# Patient Record
Sex: MALE | Race: WHITE | NOT HISPANIC OR LATINO | URBAN - NONMETROPOLITAN AREA
[De-identification: names, ages, dates, MRNs, and addresses within clinical notes are randomized per-mention and may not be internally consistent; named-entity substitution may affect disease eponyms.]

---

## 2017-02-14 ENCOUNTER — APPOINTMENT (OUTPATIENT)
Dept: GENERAL RADIOLOGY | Facility: HOSPITAL | Age: 57
End: 2017-02-14

## 2017-02-14 ENCOUNTER — APPOINTMENT (OUTPATIENT)
Dept: CT IMAGING | Facility: HOSPITAL | Age: 57
End: 2017-02-14

## 2017-02-14 ENCOUNTER — HOSPITAL ENCOUNTER (EMERGENCY)
Facility: HOSPITAL | Age: 57
Discharge: HOME | End: 2017-02-14
Attending: EMERGENCY MEDICINE | Admitting: EMERGENCY MEDICINE

## 2017-02-14 VITALS
OXYGEN SATURATION: 97 % | RESPIRATION RATE: 18 BRPM | SYSTOLIC BLOOD PRESSURE: 137 MMHG | WEIGHT: 150 LBS | DIASTOLIC BLOOD PRESSURE: 87 MMHG | TEMPERATURE: 97.8 F | HEART RATE: 84 BPM | HEIGHT: 66 IN | BODY MASS INDEX: 24.11 KG/M2

## 2017-02-14 DIAGNOSIS — J42 CHRONIC BRONCHITIS WITH ACUTE EXACERBATION (HCC): Primary | ICD-10-CM

## 2017-02-14 DIAGNOSIS — R07.2 CHEST PAIN, PRECORDIAL: ICD-10-CM

## 2017-02-14 DIAGNOSIS — J20.9 CHRONIC BRONCHITIS WITH ACUTE EXACERBATION (HCC): Primary | ICD-10-CM

## 2017-02-14 DIAGNOSIS — R06.09 DYSPNEA ON EXERTION: ICD-10-CM

## 2017-02-14 LAB
ALBUMIN SERPL-MCNC: 4.2 G/DL (ref 3.4–4.8)
ALBUMIN/GLOB SERPL: 1.4 G/DL (ref 1.1–1.8)
ALP SERPL-CCNC: 85 U/L (ref 38–126)
ALT SERPL W P-5'-P-CCNC: 32 U/L (ref 21–72)
ANION GAP SERPL CALCULATED.3IONS-SCNC: 12 MMOL/L (ref 5–15)
AST SERPL-CCNC: 43 U/L (ref 17–59)
BASOPHILS # BLD AUTO: 0.05 10*3/MM3 (ref 0–0.2)
BASOPHILS NFR BLD AUTO: 0.8 % (ref 0–2)
BILIRUB SERPL-MCNC: 0.4 MG/DL (ref 0.2–1.3)
BILIRUB UR QL STRIP: NEGATIVE
BUN BLD-MCNC: 6 MG/DL (ref 7–21)
BUN/CREAT SERPL: 9.8 (ref 7–25)
CALCIUM SPEC-SCNC: 8.8 MG/DL (ref 8.4–10.2)
CHLORIDE SERPL-SCNC: 106 MMOL/L (ref 95–110)
CK MB SERPL-CCNC: 1.06 NG/ML (ref 0–5)
CK SERPL-CCNC: 85 U/L (ref 55–170)
CLARITY UR: CLEAR
CO2 SERPL-SCNC: 26 MMOL/L (ref 22–31)
COLOR UR: YELLOW
CREAT BLD-MCNC: 0.61 MG/DL (ref 0.7–1.3)
DEPRECATED RDW RBC AUTO: 50.4 FL (ref 35.1–43.9)
EOSINOPHIL # BLD AUTO: 0.31 10*3/MM3 (ref 0–0.7)
EOSINOPHIL NFR BLD AUTO: 4.8 % (ref 0–7)
ERYTHROCYTE [DISTWIDTH] IN BLOOD BY AUTOMATED COUNT: 13.7 % (ref 11.5–14.5)
GFR SERPL CREATININE-BSD FRML MDRD: 136 ML/MIN/1.73 (ref 56–130)
GLOBULIN UR ELPH-MCNC: 2.9 GM/DL (ref 2.3–3.5)
GLUCOSE BLD-MCNC: 78 MG/DL (ref 60–100)
GLUCOSE UR STRIP-MCNC: NEGATIVE MG/DL
HCT VFR BLD AUTO: 48.3 % (ref 39–49)
HGB BLD-MCNC: 17.2 G/DL (ref 13.7–17.3)
HGB UR QL STRIP.AUTO: NEGATIVE
HOLD SPECIMEN: NORMAL
HOLD SPECIMEN: NORMAL
IMM GRANULOCYTES # BLD: 0.02 10*3/MM3 (ref 0–0.02)
IMM GRANULOCYTES NFR BLD: 0.3 % (ref 0–0.5)
KETONES UR QL STRIP: NEGATIVE
LEUKOCYTE ESTERASE UR QL STRIP.AUTO: NEGATIVE
LYMPHOCYTES # BLD AUTO: 2.82 10*3/MM3 (ref 0.6–4.2)
LYMPHOCYTES NFR BLD AUTO: 43.3 % (ref 10–50)
MCH RBC QN AUTO: 35.6 PG (ref 26.5–34)
MCHC RBC AUTO-ENTMCNC: 35.6 G/DL (ref 31.5–36.3)
MCV RBC AUTO: 100 FL (ref 80–98)
MONOCYTES # BLD AUTO: 0.66 10*3/MM3 (ref 0–0.9)
MONOCYTES NFR BLD AUTO: 10.1 % (ref 0–12)
NEUTROPHILS # BLD AUTO: 2.65 10*3/MM3 (ref 2–8.6)
NEUTROPHILS NFR BLD AUTO: 40.7 % (ref 37–80)
NITRITE UR QL STRIP: NEGATIVE
NT-PROBNP SERPL-MCNC: 231 PG/ML (ref 0–900)
PH UR STRIP.AUTO: 5.5 [PH] (ref 5–9)
PLATELET # BLD AUTO: 188 10*3/MM3 (ref 150–450)
PMV BLD AUTO: 9.6 FL (ref 8–12)
POTASSIUM BLD-SCNC: 4.5 MMOL/L (ref 3.5–5.1)
PROT SERPL-MCNC: 7.1 G/DL (ref 6.3–8.6)
PROT UR QL STRIP: NEGATIVE
RBC # BLD AUTO: 4.83 10*6/MM3 (ref 4.37–5.74)
SODIUM BLD-SCNC: 144 MMOL/L (ref 137–145)
SP GR UR STRIP: 1 (ref 1–1.03)
TROPONIN I SERPL-MCNC: 0.01 NG/ML
UROBILINOGEN UR QL STRIP: ABNORMAL
WBC NRBC COR # BLD: 6.51 10*3/MM3 (ref 3.2–9.8)
WHOLE BLOOD HOLD SPECIMEN: NORMAL
WHOLE BLOOD HOLD SPECIMEN: NORMAL

## 2017-02-14 PROCEDURE — 82550 ASSAY OF CK (CPK): CPT | Performed by: EMERGENCY MEDICINE

## 2017-02-14 PROCEDURE — 93010 ELECTROCARDIOGRAM REPORT: CPT | Performed by: INTERNAL MEDICINE

## 2017-02-14 PROCEDURE — 0 IOPAMIDOL 61 % SOLUTION: Performed by: EMERGENCY MEDICINE

## 2017-02-14 PROCEDURE — 71020 HC CHEST PA AND LATERAL: CPT

## 2017-02-14 PROCEDURE — 80053 COMPREHEN METABOLIC PANEL: CPT | Performed by: EMERGENCY MEDICINE

## 2017-02-14 PROCEDURE — 82553 CREATINE MB FRACTION: CPT | Performed by: EMERGENCY MEDICINE

## 2017-02-14 PROCEDURE — 84484 ASSAY OF TROPONIN QUANT: CPT | Performed by: EMERGENCY MEDICINE

## 2017-02-14 PROCEDURE — 71275 CT ANGIOGRAPHY CHEST: CPT

## 2017-02-14 PROCEDURE — 85025 COMPLETE CBC W/AUTO DIFF WBC: CPT | Performed by: EMERGENCY MEDICINE

## 2017-02-14 PROCEDURE — 93005 ELECTROCARDIOGRAM TRACING: CPT | Performed by: FAMILY MEDICINE

## 2017-02-14 PROCEDURE — 81003 URINALYSIS AUTO W/O SCOPE: CPT | Performed by: EMERGENCY MEDICINE

## 2017-02-14 PROCEDURE — 99284 EMERGENCY DEPT VISIT MOD MDM: CPT

## 2017-02-14 PROCEDURE — 83880 ASSAY OF NATRIURETIC PEPTIDE: CPT | Performed by: EMERGENCY MEDICINE

## 2017-02-14 RX ORDER — AZITHROMYCIN 250 MG/1
TABLET, FILM COATED ORAL
Qty: 6 TABLET | Refills: 0 | Status: SHIPPED | OUTPATIENT
Start: 2017-02-14 | End: 2017-02-23

## 2017-02-14 RX ORDER — IPRATROPIUM BROMIDE AND ALBUTEROL SULFATE 2.5; .5 MG/3ML; MG/3ML
3 SOLUTION RESPIRATORY (INHALATION) ONCE
Status: COMPLETED | OUTPATIENT
Start: 2017-02-14 | End: 2017-02-14

## 2017-02-14 RX ORDER — SODIUM CHLORIDE 0.9 % (FLUSH) 0.9 %
10 SYRINGE (ML) INJECTION AS NEEDED
Status: DISCONTINUED | OUTPATIENT
Start: 2017-02-14 | End: 2017-02-15 | Stop reason: HOSPADM

## 2017-02-14 RX ORDER — PREDNISONE 50 MG/1
50 TABLET ORAL DAILY
Qty: 5 TABLET | Refills: 0 | Status: SHIPPED | OUTPATIENT
Start: 2017-02-14 | End: 2017-02-23

## 2017-02-14 RX ORDER — ALBUTEROL SULFATE 90 UG/1
2 AEROSOL, METERED RESPIRATORY (INHALATION) EVERY 4 HOURS PRN
Qty: 1 INHALER | Refills: 0 | Status: SHIPPED | OUTPATIENT
Start: 2017-02-14 | End: 2017-03-23 | Stop reason: SDUPTHER

## 2017-02-14 RX ADMIN — IPRATROPIUM BROMIDE AND ALBUTEROL SULFATE 3 ML: 2.5; .5 SOLUTION RESPIRATORY (INHALATION) at 13:09

## 2017-02-14 RX ADMIN — IOPAMIDOL 95 ML: 612 INJECTION, SOLUTION INTRAVENOUS at 15:13

## 2017-02-14 NOTE — DISCHARGE INSTRUCTIONS
Followup with Dr. Amador from Pulmonary for further evaluation of your lungs.  Do your best to minimize or quit smoking.  Followup with Dr. [facility] for cardiac stress testing.  In the interim, start a baby aspirin a day for its heart protective effects.  Call the family medicine service to establish with a primary care physician to oversee your care.  No strenuous activites.  Return with any new or worsening symptoms, or any concerns.

## 2017-02-14 NOTE — ED PROVIDER NOTES
Subjective   HPI Comments: Patient with 4 months of sob.  No real waxing and waning today, though some worsening over the past four months with decreased exercise tolerance and some chest pain starting four months prior.  No f/c.  No n/v.  Heavy smoker for years.  Family hx of lung CA.      Patient is a 56 y.o. male presenting with shortness of breath.   Shortness of Breath   Severity:  Moderate  Onset quality:  Gradual  Duration:  4 months  Timing:  Unable to specify  Progression:  Worsening  Chronicity:  New  Context: activity    Relieved by:  Nothing  Worsened by:  Activity, exertion and movement  Ineffective treatments:  None tried  Associated symptoms: chest pain (intermittent left sided chest pains, none today)    Associated symptoms: no abdominal pain, no cough, no diaphoresis, no fever, no headaches, no hemoptysis, no neck pain, no sore throat and no vomiting    Risk factors: tobacco use        Review of Systems   Constitutional: Negative.  Negative for appetite change, chills, diaphoresis and fever.   HENT: Negative.  Negative for congestion and sore throat.    Eyes: Negative.  Negative for photophobia and visual disturbance.   Respiratory: Positive for chest tightness and shortness of breath. Negative for cough and hemoptysis.    Cardiovascular: Positive for chest pain (intermittent left sided chest pains, none today). Negative for palpitations.   Gastrointestinal: Negative.  Negative for abdominal pain, constipation, diarrhea, nausea and vomiting.   Endocrine: Negative.    Genitourinary: Negative.  Negative for decreased urine volume, dysuria, flank pain and hematuria.   Musculoskeletal: Negative.  Negative for arthralgias, back pain, myalgias, neck pain and neck stiffness.   Skin: Negative.  Negative for pallor.   Neurological: Negative.  Negative for dizziness, syncope, weakness, light-headedness, numbness and headaches.   Psychiatric/Behavioral: Negative.  Negative for confusion and suicidal ideas. The  patient is not nervous/anxious.        No past medical history on file.    No Known Allergies    No past surgical history on file.    No family history on file.    Social History     Social History   • Marital status:      Spouse name: N/A   • Number of children: N/A   • Years of education: N/A     Social History Main Topics   • Smoking status: Current Every Day Smoker     Packs/day: 2.00     Types: Cigarettes   • Smokeless tobacco: Not on file   • Alcohol use Yes      Comment: occasional   • Drug use: No   • Sexual activity: Defer     Other Topics Concern   • Not on file     Social History Narrative   • No narrative on file           Objective   Physical Exam   Constitutional: He is oriented to person, place, and time. He appears well-developed and well-nourished. No distress.   HENT:   Head: Normocephalic and atraumatic.   Eyes: Conjunctivae and EOM are normal.   Neck: Normal range of motion. Neck supple. No JVD present.   Cardiovascular: Normal rate, regular rhythm, normal heart sounds and intact distal pulses.  Exam reveals no gallop and no friction rub.    No murmur heard.  Pulmonary/Chest: Effort normal. No respiratory distress. He has wheezes. He has no rales. He exhibits no tenderness.   Abdominal: Soft. Bowel sounds are normal. He exhibits no distension and no mass. There is no tenderness. There is no rebound and no guarding.   Musculoskeletal: Normal range of motion.   Neurological: He is alert and oriented to person, place, and time.   Skin: Skin is warm and dry.   Psychiatric: He has a normal mood and affect. His behavior is normal. Judgment and thought content normal.   Nursing note and vitals reviewed.      ECG 12 Lead    Date/Time: 2/14/2017 11:41 AM  Performed by: EDUARDO GILMORE  Authorized by: EDUARDO GILMORE   Interpreted by physician  Rhythm: sinus rhythm  Ectopy comments: pacs  Rate: normal  QRS axis: right  ST Segments: ST segments normal  T Waves: T waves normal  Other: no other  findings                 ED Course  ED Course      Labs Reviewed   COMPREHENSIVE METABOLIC PANEL - Abnormal; Notable for the following:        Result Value    BUN 6 (*)     Creatinine 0.61 (*)     eGFR Non  Amer 136 (*)     All other components within normal limits   URINALYSIS W/ CULTURE IF INDICATED - Abnormal; Notable for the following:     Specific Gravity, UA 1.002 (*)     All other components within normal limits    Narrative:     Urine microscopic not indicated.   CBC WITH AUTO DIFFERENTIAL - Abnormal; Notable for the following:     .0 (*)     MCH 35.6 (*)     RDW-SD 50.4 (*)     All other components within normal limits   BNP (IN-HOUSE) - Normal   CK - Normal   CK MB - Normal   TROPONIN (IN-HOUSE) - Normal   RAINBOW DRAW    Narrative:     The following orders were created for panel order [place] Draw.  Procedure                               Abnormality         Status                     ---------                               -----------         ------                     Light Blue Top[[id number]]                                    In process                 Green Top (Gel)[[id number]]                                   In process                 Lavender Top[[id number]]                                      In process                 Gold Top - SST[[id number]]                                    In process                   Please view results for these tests on the individual orders.   CBC AND DIFFERENTIAL    Narrative:     The following orders were created for panel order CBC & Differential.  Procedure                               Abnormality         Status                     ---------                               -----------         ------                     CBC Auto Differential[[id number]]         Abnormal            Final result                 Please view results for these tests on the individual orders.   LIGHT BLUE TOP   GREEN TOP   LAVENDER TOP   GOLD TOP - SST       CT Angiogram Chest With  "Contrast   Final Result   CONCLUSION:   Small area of tree-in-bud opacifications in the superior segment of the left lower lobe with adjacent enlarged left hilar lymph nodes. \"Tree-in-bud\" appearance is usually due to endo/peribronchiolar diseases and may be seen with bronchopneumonia,    bronchiectasis, bronchogenic spread of tuberculosis, bronchiolitis obliterans and bronchiolitis obliterans with organizing pneumonia, respiratory bronchiolitis, and hypersensitivity pneumonia.       Electronically signed by:  Doron Amador MD  2/14/2017 3:37 PM CST         XR Chest 2 View   Final Result      1.  Questionable left-sided hilar lymphadenopathy. 2.  COPD   without radiographic evidence of acute cardiopulmonary disease.      Electronically signed by:  Khadijah Amador MD  2/14/2017 1:14 PM CST   Workstation: [email or web address]        CTA after concerns with lymphadenopathy and possible CA.  Findings discussed with pulmonary, Dr. Amador, who will see the patient in followup.  No CA masses noted at this time.  More likely chronic bronchitis.  Estrellita does have wheezing, and was started on inhaler, steroid and abx.  Also referral for stress testing in light of subacute chest pain with marker elevation after mothers and no EKG findings of ACS, will start daily ASA.  Patient and family informed and agree with plan.                MDM    Final diagnoses:   Chronic bronchitis with acute exacerbation   Dyspnea on exertion   Chest pain, precordial            Reinier Chance MD  02/14/17 [id number]    "

## 2017-02-23 ENCOUNTER — OFFICE VISIT (OUTPATIENT)
Dept: PULMONOLOGY | Facility: CLINIC | Age: 57
End: 2017-02-23

## 2017-02-23 VITALS
BODY MASS INDEX: 24.11 KG/M2 | OXYGEN SATURATION: 98 % | WEIGHT: 150 LBS | HEIGHT: 66 IN | HEART RATE: 73 BPM | DIASTOLIC BLOOD PRESSURE: 86 MMHG | SYSTOLIC BLOOD PRESSURE: 140 MMHG

## 2017-02-23 DIAGNOSIS — J30.89 PERENNIAL ALLERGIC RHINITIS, UNSPECIFIED ALLERGIC RHINITIS TRIGGER: ICD-10-CM

## 2017-02-23 DIAGNOSIS — R93.89 ABNORMAL CT OF THE CHEST: ICD-10-CM

## 2017-02-23 DIAGNOSIS — J44.9 MODERATE COPD (CHRONIC OBSTRUCTIVE PULMONARY DISEASE) (HCC): ICD-10-CM

## 2017-02-23 DIAGNOSIS — F17.200 TOBACCO USE DISORDER: ICD-10-CM

## 2017-02-23 DIAGNOSIS — J41.0 SIMPLE CHRONIC BRONCHITIS (HCC): Primary | ICD-10-CM

## 2017-02-23 PROBLEM — J42 CHRONIC BRONCHITIS (HCC): Status: ACTIVE | Noted: 2017-02-23

## 2017-02-23 PROCEDURE — 99204 OFFICE O/P NEW MOD 45 MIN: CPT | Performed by: INTERNAL MEDICINE

## 2017-02-23 PROCEDURE — 94727 GAS DIL/WSHOT DETER LNG VOL: CPT | Performed by: INTERNAL MEDICINE

## 2017-02-23 PROCEDURE — 94060 EVALUATION OF WHEEZING: CPT | Performed by: INTERNAL MEDICINE

## 2017-02-23 PROCEDURE — 99406 BEHAV CHNG SMOKING 3-10 MIN: CPT | Performed by: INTERNAL MEDICINE

## 2017-02-23 PROCEDURE — 94729 DIFFUSING CAPACITY: CPT | Performed by: INTERNAL MEDICINE

## 2017-02-23 RX ORDER — FLUTICASONE PROPIONATE 50 MCG
2 SPRAY, SUSPENSION (ML) NASAL DAILY
Qty: 1 EACH | Refills: 0 | Status: SHIPPED | OUTPATIENT
Start: 2017-02-23 | End: 2017-03-25

## 2017-02-23 RX ORDER — PREDNISONE 20 MG/1
20 TABLET ORAL DAILY
Qty: 5 TABLET | Refills: 0 | Status: SHIPPED | OUTPATIENT
Start: 2017-02-23 | End: 2017-03-23

## 2017-02-23 NOTE — PROGRESS NOTES
Pulmonary Consultation    Subjective     Chief Complaint   Patient presents with   • Bronchitis     Hospital F/u        History of Present Illness  Gregg Echols is a 56 y.o. male with a PMH significant for chronic bronchitis and tobacco use who presents for evaluation of bronchitis and an abnormal CT. Pt reports going to the ED on  due to dyspnea and chest pain. He had a CTPA which showed some tree in bud opacities. Pt was diagnosed with chronic bronchitis and given azithro, prednisone and albuterol. He does feel like the meds helped, but he continues to have dyspnea. Pt is using the albuterol 3-4x/d but it does not have a lasting effect. Pt admits to noticing sudden dyspnea 2 months ago. He admits to wheeze, nonproductive cough, occasional sharp left sided chest pain, rhinorrhea, nasal congestion, and posts nasal gtt. He denies prior allergies. Pt smokes 1.5ppd since age 15yo. He denies prior diagnosis of asthma or COPD. Both parents and his brother  of lung cancer. He has worked as a  and loading lime.  He does not have pets. Pt denies known exposures to TB or asbestos.    Review of Systems: History obtained from chart review and the patient.  Review of Systems   HENT: Positive for congestion and postnasal drip.    Respiratory: Positive for cough, shortness of breath and wheezing.    Cardiovascular: Positive for chest pain.   Psychiatric/Behavioral: Positive for dysphoric mood.     As described in the HPI. Otherwise, remainder of ROS (14 systems) were negative.    Patient Active Problem List   Diagnosis   • Chronic bronchitis         Current Outpatient Prescriptions:   •  albuterol (PROVENTIL HFA;VENTOLIN HFA) 108 (90 BASE) MCG/ACT inhaler, Inhale 2 puffs Every 4 (Four) Hours As Needed for wheezing or shortness of air., Disp: 1 inhaler, Rfl: 0  •  fluticasone (FLONASE) 50 MCG/ACT nasal spray, 2 sprays into each nostril Daily for 30 days., Disp: 1 each, Rfl: 0  •  predniSONE (DELTASONE) 20 MG  "tablet, Take 1 tablet by mouth Daily., Disp: 5 tablet, Rfl: 0  •  Umeclidinium-Vilanterol 62.5-25 MCG/INH aerosol powder , Inhale 1 puff Daily., Disp: 1 each, Rfl: 11    Past Medical History   Diagnosis Date   • Chronic bronchitis    • Pneumonia      Past Surgical History   Procedure Laterality Date   • Gallbladder surgery       Social History     Social History   • Marital status:      Spouse name: N/A   • Number of children: N/A   • Years of education: N/A     Social History Main Topics   • Smoking status: Current Every Day Smoker     Packs/day: 2.00     Types: Cigarettes   • Smokeless tobacco: Current User   • Alcohol use Yes      Comment: occasional   • Drug use: No   • Sexual activity: Defer     Other Topics Concern   • None     Social History Narrative     Family History   Problem Relation Age of Onset   • Cancer Mother    • Cancer Father    • Diabetes Father    • Emphysema Father    • Cancer Brother    • Heart failure Brother           Objective     Blood pressure 140/86, pulse 73, height 66\" (167.6 cm), weight 150 lb (68 kg), SpO2 98 %.  Physical Exam   Constitutional: He is oriented to person, place, and time. Vital signs are normal. He appears well-developed and well-nourished.   HENT:   Head: Normocephalic and atraumatic.   Nose: Mucosal edema and rhinorrhea present.   Mouth/Throat: Uvula is midline, oropharynx is clear and moist and mucous membranes are normal.   mallampati 3   Eyes: Conjunctivae, EOM and lids are normal. Pupils are equal, round, and reactive to light.   Neck: Trachea normal and normal range of motion. No tracheal tenderness present. No thyroid mass present.   Cardiovascular: Normal rate, regular rhythm and normal heart sounds.  Exam reveals no gallop.    No murmur heard.  Pulmonary/Chest: Effort normal. No respiratory distress. He has no decreased breath sounds. He has wheezes (coarse throughout). He has no rhonchi. Chest wall is not dull to percussion. He exhibits no tenderness. "   Abdominal: Soft. Normal appearance and bowel sounds are normal. There is no tenderness.       Vascular Status -  His exam exhibits no right foot edema. His exam exhibits no left foot edema.  Lymphadenopathy:        Head (right side): No submandibular adenopathy present.        Head (left side): No submandibular adenopathy present.     He has no cervical adenopathy.        Right: No supraclavicular adenopathy present.        Left: No supraclavicular adenopathy present.   Neurological: He is alert and oriented to person, place, and time.   Skin: Skin is warm and dry. No cyanosis. Nails show no clubbing.   Psychiatric: His speech is normal and behavior is normal. Judgment normal. He exhibits a depressed mood.   Nursing note and vitals reviewed.      PFTs: 2/23/17  Ratio 51  FVC 3.87/ 93%  FEV1 1.98/ 62%  TLC 6.92/ 112%  RV/ %  DLCO 21.23/ 78%  Moderate obstruction with no significant bronchodilator response.  Normal lung volumes but evidence of air trapping.  Mildly reduced diffusion.  No comparative data available.    Radiology (independently reviewed and interpreted by me): CTPA showed no PE but small area of tree-in-bud opacities in the superior segment left lower lobe adjacent left hilar adenopathy.       Assessment/Plan     Gregg was seen today for bronchitis.    Diagnoses and all orders for this visit:    Simple chronic bronchitis  -     predniSONE (DELTASONE) 20 MG tablet; Take 1 tablet by mouth Daily.    Tobacco use disorder    Moderate COPD (chronic obstructive pulmonary disease)  -     Umeclidinium-Vilanterol 62.5-25 MCG/INH aerosol powder ; Inhale 1 puff Daily.    Abnormal CT of the chest    Perennial allergic rhinitis, unspecified allergic rhinitis trigger  -     fluticasone (FLONASE) 50 MCG/ACT nasal spray; 2 sprays into each nostril Daily for 30 days.         Discussion/ Recommendations:   PFTs consistent with moderate COPD, and symptoms consistent with chronic bronchitis.  He did have  improvement with Z-Tyler, prednisone taper, and albuterol, but continues to have issues.  Unfortunately, the patient continues to smoke, and is pre-contemplative.    -Start Anoro daily.  Samples provided and instructed on use.  -Continue albuterol as needed.  Instructed on proper technique.  -Start Flonase daily.  Instructed on proper technique.  -I encouraged the patient to avoid all tobacco products.  I counselled him on ways to quit smoking, and spoke to the detriment of second hand smoke.  Discussed ways in which to quit, including with the assistance of nicotine replacement products or medications.  Patient may benefit from group therapy, and 1-800-QUIT-NOW was recommended for support. Spent 6 mins.  -Consider repeat CT at some point to ensure clearance of tree-in-bud opacities and resolution of isolated left hilar reactive adenopathy.  -He is going to try to get a PCP set up at the [facility] in [place].         Return in about 4 weeks (around 3/23/2017) for Recheck.      Thank you for allowing me to participate in the care of Gregg Echols. Please do not hesitate to contact me with any questions.         This document has been electronically signed by Liana Amador MD on February 23, 2017 2:43 PM      EMR Dragon/Transcription disclaimer:     Much of this encounter note is an electronic transcription/translation of spoken language to printed text. The electronic translation of spoken language may permit erroneous, or at times, nonsensical words or phrases to be inadvertently transcribed; Although I have reviewed the note for such errors, some may still exist.

## 2017-03-23 ENCOUNTER — OFFICE VISIT (OUTPATIENT)
Dept: PULMONOLOGY | Facility: CLINIC | Age: 57
End: 2017-03-23

## 2017-03-23 VITALS
DIASTOLIC BLOOD PRESSURE: 81 MMHG | BODY MASS INDEX: 27.8 KG/M2 | SYSTOLIC BLOOD PRESSURE: 124 MMHG | OXYGEN SATURATION: 97 % | HEIGHT: 66 IN | HEART RATE: 85 BPM | WEIGHT: 173 LBS

## 2017-03-23 DIAGNOSIS — F17.200 TOBACCO USE DISORDER: ICD-10-CM

## 2017-03-23 DIAGNOSIS — J41.0 SIMPLE CHRONIC BRONCHITIS (HCC): Primary | ICD-10-CM

## 2017-03-23 DIAGNOSIS — J31.0 CHRONIC RHINITIS: ICD-10-CM

## 2017-03-23 PROCEDURE — 99214 OFFICE O/P EST MOD 30 MIN: CPT | Performed by: INTERNAL MEDICINE

## 2017-03-23 PROCEDURE — 99406 BEHAV CHNG SMOKING 3-10 MIN: CPT | Performed by: INTERNAL MEDICINE

## 2017-03-23 RX ORDER — ALBUTEROL SULFATE 90 UG/1
2 AEROSOL, METERED RESPIRATORY (INHALATION) EVERY 4 HOURS PRN
Qty: 1 INHALER | Refills: 11 | Status: SHIPPED | OUTPATIENT
Start: 2017-03-23

## 2017-03-23 RX ORDER — VARENICLINE TARTRATE 1 MG/1
1 TABLET, FILM COATED ORAL 2 TIMES DAILY
Qty: 60 TABLET | Refills: 2 | Status: SHIPPED | OUTPATIENT
Start: 2017-03-23 | End: 2017-04-06 | Stop reason: SDDI

## 2017-03-23 NOTE — PROGRESS NOTES
Pulmonary Office Follow-up    Subjective     Gregg Echols is seen today at the office for   Chief Complaint   Patient presents with   • Follow-up         HPI  Gregg Echols is a 57 y.o. male with a PMH significant for chronic bronchitis and tobacco use who presents for follow-up of bronchitis.  Patient was seen initially by me on 2/23/17 as follow-up from an ED visit.  At that time I recommended starting on Anoro as well as Flonase daily.  I also counseled him on the importance of this smoking cessation given his chronic bronchitis.  He does feel like the Anoro is helping, but he continues to require his albuterol on a daily basis.  His cough is starting to improve but it does continue.  Patient is using the Flonase daily but still misses some mild nasal congestion.  Importantly, he continues to smoke 1.5 packs per day, but he is interested in trying Chantix to help him quit.  He does state he is try the nicotine replacement products like the patch in the past without success.  He denies any ED visits or recent steroid use.      Patient Active Problem List   Diagnosis   • Chronic bronchitis   • Tobacco use disorder   • Chronic rhinitis       Review of Systems  Review of Systems   HENT: Positive for congestion and postnasal drip.    Respiratory: Positive for cough, shortness of breath and wheezing.    Psychiatric/Behavioral: Positive for dysphoric mood.     As described in the HPI. Otherwise, remainder of ROS (14 systems) were negative.    Medications, Allergies, Social, and Family Histories reviewed as per EMR.    Objective     Vitals:    03/23/17 1300   BP: 124/81   Pulse: 85   SpO2: 97%     Physical Exam   Constitutional: He is oriented to person, place, and time. Vital signs are normal. He appears well-developed and well-nourished.   HENT:   Head: Normocephalic and atraumatic.   Nose: No mucosal edema.   Mouth/Throat: Uvula is midline, oropharynx is clear and moist and mucous membranes are normal.   mallampati 3    Eyes: Conjunctivae, EOM and lids are normal. Pupils are equal, round, and reactive to light.   Neck: Trachea normal and normal range of motion. No tracheal tenderness present. No thyroid mass present.   Cardiovascular: Normal rate, regular rhythm and normal heart sounds.  Exam reveals no gallop.    No murmur heard.  Pulmonary/Chest: Effort normal. No respiratory distress. He has no decreased breath sounds. He has no wheezes (coarse throughout). He has no rhonchi.   Abdominal: Soft. Normal appearance and bowel sounds are normal. There is no tenderness.       Vascular Status -  His exam exhibits no right foot edema. His exam exhibits no left foot edema.  Lymphadenopathy:        Head (right side): No submandibular adenopathy present.        Head (left side): No submandibular adenopathy present.     He has no cervical adenopathy.        Right: No supraclavicular adenopathy present.        Left: No supraclavicular adenopathy present.   Neurological: He is alert and oriented to person, place, and time.   Skin: Skin is warm and dry. No cyanosis. Nails show no clubbing.   Psychiatric: He has a normal mood and affect. His speech is normal and behavior is normal. Judgment normal.   Nursing note and vitals reviewed.          Assessment/Plan     Gregg was seen today for follow-up.    Diagnoses and all orders for this visit:    Simple chronic bronchitis  -     albuterol (PROVENTIL HFA;VENTOLIN HFA) 108 (90 BASE) MCG/ACT inhaler; Inhale 2 puffs Every 4 (Four) Hours As Needed for Wheezing or Shortness of Air.    Tobacco use disorder  -     varenicline (CHANTIX JUSTINE) 0.5 MG X 11 & 1 MG X 42 tablet; Use as directed on package instructions, try to quit smoking after 1 week  -     varenicline (CHANTIX) 1 MG tablet; Take 1 tablet by mouth 2 (Two) Times a Day.    Chronic rhinitis         Discussion/ Recommendations:   He is doing well on the Anoro, but I think we will not be yellow to get him well controlled until he stops smoking.  He  is interested in trying Chantix and I counseled him on the possible side effects including vivid dreams and worsening depression with suicidal ideations.    -Continue Anoro daily and albuterol as needed.  -Start Chantix.  Will likely require prior authorization with his insurance, but he has tried nicotine products in the past without success.  Instructed him to pick a quit date within 14 days of starting the Chantix and not continuing longer than 3 months.  Spent 5 minutes counseling on the importance of smoking cessation.  -Continue Flonase daily.  We'll consider stopping once allergy season has tapered off.    Return in about 2 months (around 5/23/2017) for Recheck.          This document has been electronically signed by Liana Amador MD on March 23, 2017 1:23 PM      EMR Dragon/Transcription disclaimer:     Much of this encounter note is an electronic transcription/translation of spoken language to printed text. The electronic translation of spoken language may permit erroneous, or at times, nonsensical words or phrases to be inadvertently transcribed; Although I have reviewed the note for such errors, some may still exist.

## 2017-03-27 RX ORDER — FLUTICASONE PROPIONATE 50 MCG
2 SPRAY, SUSPENSION (ML) NASAL DAILY
Qty: 1 EACH | Refills: 0 | Status: SHIPPED | OUTPATIENT
Start: 2017-03-27 | End: 2017-04-26

## 2017-03-29 ENCOUNTER — OFFICE VISIT (OUTPATIENT)
Dept: CARDIOLOGY | Facility: CLINIC | Age: 57
End: 2017-03-29

## 2017-03-29 VITALS
WEIGHT: 173 LBS | BODY MASS INDEX: 27.8 KG/M2 | HEIGHT: 66 IN | HEART RATE: 79 BPM | SYSTOLIC BLOOD PRESSURE: 128 MMHG | DIASTOLIC BLOOD PRESSURE: 88 MMHG

## 2017-03-29 DIAGNOSIS — R07.89 OTHER CHEST PAIN: Primary | ICD-10-CM

## 2017-03-29 DIAGNOSIS — F17.200 TOBACCO USE DISORDER: ICD-10-CM

## 2017-03-29 PROBLEM — R07.9 CHEST PAIN: Status: ACTIVE | Noted: 2017-03-29

## 2017-03-29 PROCEDURE — 93000 ELECTROCARDIOGRAM COMPLETE: CPT | Performed by: INTERNAL MEDICINE

## 2017-03-29 PROCEDURE — 99203 OFFICE O/P NEW LOW 30 MIN: CPT | Performed by: INTERNAL MEDICINE

## 2017-03-29 NOTE — PROGRESS NOTES
[facility]  OFFICE NOTE    Gregg Echols  57 y.o. male    03/29/2017  1. Other chest pain    2. Tobacco use disorder        Chief complaint -shortness of breath and chest pain      History of present Illness- 57-year-old gentleman who was in the emergency room in February with chest pain and shortness of breath and he had a CTA which showed chronic bronchitis with early pneumonitis and had seen a pulmonologist and does put him on nebulizers and inhalers.  He is using Chantix to help him quit smoking.  His father had heart problems at the age of 50.  He has smoked all his life at least a pack a day.  He denies any cardiac problems so far.  His EKG is normal.  He denies any GI symptoms or CNS symptoms      No Known Allergies      Past Medical History:   Diagnosis Date   • Calculus of gallbladder with acute cholecystitis without obstruction    • Chronic bronchitis    • Pneumonia          Past Surgical History:   Procedure Laterality Date   • BACK SURGERY     • GALLBLADDER SURGERY  12/16/2015    Laparoscopic converted to open cholecystectomy         Family History   Problem Relation Age of Onset   • Cancer Mother    • Cancer Father    • Diabetes Father    • Emphysema Father    • Cancer Brother    • Heart failure Brother          Social History     Social History   • Marital status:      Spouse name: N/A   • Number of children: N/A   • Years of education: N/A     Occupational History   • Not on file.     Social History Main Topics   • Smoking status: Current Every Day Smoker     Packs/day: 2.00     Types: Cigarettes   • Smokeless tobacco: Not on file   • Alcohol use Yes      Comment: occasional   • Drug use: No   • Sexual activity: Defer     Other Topics Concern   • Not on file     Social History Narrative         Current Outpatient Prescriptions   Medication Sig Dispense Refill   • albuterol (PROVENTIL HFA;VENTOLIN HFA) 108 (90 BASE) MCG/ACT inhaler Inhale 2 puffs Every 4 (Four)  "Hours As Needed for Wheezing or Shortness of Air. 1 inhaler 11   • fluticasone (FLONASE) 50 MCG/ACT nasal spray 2 sprays into each nostril Daily for 30 days. 1 each 0   • Umeclidinium-Vilanterol 62.5-25 MCG/INH aerosol powder  Inhale 1 puff Daily. 1 each 11   • varenicline (CHANTIX JUSTINE) 0.5 MG X 11 & 1 MG X 42 tablet Use as directed on package instructions, try to quit smoking after 1 week 53 tablet 0   • varenicline (CHANTIX) 1 MG tablet Take 1 tablet by mouth 2 (Two) Times a Day. 60 tablet 2     No current facility-administered medications for this visit.          Review of Systems     Constitution: Denies any fatigue, fever or chills    HENT: Denies any headache, hearing impairment,     Eyes: Denies any blurring of vision, or photophobia     Cardivascular - As per history of present illness     Respiratory system- COPD with  chronic bronchitis       Endocrine:  No history of hyperlipidemia, diabetes,                      Hypothyrodism       Musculoskeletal:  No history of arthritis with musculoskeletal problems    Gastrointestinal: No nausea, vomiting, or melena    Genitourinary: No dysuria or hematuria    Neurological:   No history of seizure disorder, stroke, memory problems    Psychiatric/Behavioral:        No history of depression,  No history of bipolar disorder or schizophrenia     Hematological- no history of easy bruising or any bleeding diathesis            OBJECTIVE    /88  Pulse 79  Ht 66\" (167.6 cm)  Wt 173 lb (78.5 kg)  BMI 27.92 kg/m2      Physical Exam     Constitutional: is oriented to person, place, and time.     Skin-warm and dry    Well developed and nourished in no acute distress      Head: Normocephalic and atraumatic.     Eyes: Pupils are equal, round, and reactive to light.     Neck: Neck supple. No bruit in the carotids, no elevation of JVD    Cardiovascular: [place] in the fifth intercostal space   Regular rate, and  Rhythm,    S1 greater than S2, no S3 or S4, no gallop "     Pulmonary/Chest:   Air  Entry is equal on both sides  No wheezing or crackles,      Abdominal: Soft.  No hepatosplenomegaly, bowel sounds are present    Musculoskeletal: No kyphoscoliosis, no significant thickening of the joints    Neurological: is alert and oriented to person, place, and time.    cranial nerve are intact .   No motor or sensory deficit    Extremities-no edema, no radial femoral delay      Psychiatric: He has a normal mood and affect.                  His behavior is normal.             ECG 12 Lead  Date/Time: 3/29/2017 2:07 PM  Performed by: JEFFERSON ROSALES  Authorized by: JEFFERSON ROSALES   Comparison: not compared with previous ECG   Rhythm: sinus rhythm  Clinical impression: normal ECG              Lab Results   Component Value Date    WBC 6.51 02/14/2017    HGB 17.2 02/14/2017    HCT 48.3 02/14/2017    .0 (H) 02/14/2017     02/14/2017     Lab Results   Component Value Date    GLUCOSE 78 02/14/2017    BUN 6 (L) 02/14/2017    CREATININE 0.61 (L) 02/14/2017    EGFRIFNONA 136 (H) 02/14/2017    BCR 9.8 02/14/2017    CO2 26.0 02/14/2017    CALCIUM 8.8 02/14/2017    ALBUMIN 4.20 02/14/2017    LABIL2 1.4 02/14/2017    AST 43 02/14/2017    ALT 32 02/14/2017                  A/P    Chest pain and shortness of breath will schedule an exercise treadmill to rule out ischemia as his EKG is normal and has a long-standing history of tobacco use and family history of CAD.  Will get an echo to assess LV function and valvular function.  If those are okay then we'll continue risk factor modification.    Tobacco use-is cut down the quantity but is using Chantix to help him quit smoking.    COPD -chronic bronchitis on Proventil inhaler and being followed in pulmonary        Jefferson Rosales MD  3/29/2017  2:04 PM    EMR Dragon/Transcription disclaimer:   Some of this note may be an electronic transcription/translation of spoken language to printed text. The electronic translation of  spoken language may permit erroneous, or at times, nonsensical words or phrases to be inadvertently transcribed; Although I have reviewed the note for such errors, some may still exist.

## 2017-04-03 ENCOUNTER — HOSPITAL ENCOUNTER (OUTPATIENT)
Dept: CARDIOLOGY | Facility: HOSPITAL | Age: 57
Discharge: HOME | End: 2017-04-03
Attending: INTERNAL MEDICINE | Admitting: INTERNAL MEDICINE

## 2017-04-03 LAB
BH CV ECHO MEAS - ACS: 2 CM
BH CV ECHO MEAS - AO MAX PG (FULL): 5.1 MMHG
BH CV ECHO MEAS - AO MAX PG: 12.5 MMHG
BH CV ECHO MEAS - AO MEAN PG (FULL): 2 MMHG
BH CV ECHO MEAS - AO MEAN PG: 5 MMHG
BH CV ECHO MEAS - AO ROOT AREA (BSA CORRECTED): 2
BH CV ECHO MEAS - AO ROOT AREA: 11.3 CM^2
BH CV ECHO MEAS - AO ROOT DIAM: 3.8 CM
BH CV ECHO MEAS - AO V2 MAX: 177 CM/SEC
BH CV ECHO MEAS - AO V2 MEAN: 109 CM/SEC
BH CV ECHO MEAS - AO V2 VTI: 33.7 CM
BH CV ECHO MEAS - BSA(HAYCOCK): 1.9 M^2
BH CV ECHO MEAS - BSA: 1.9 M^2
BH CV ECHO MEAS - BZI_BMI: 27.9 KILOGRAMS/M^2
BH CV ECHO MEAS - BZI_METRIC_HEIGHT: 167.6 CM
BH CV ECHO MEAS - BZI_METRIC_WEIGHT: 78.5 KG
BH CV ECHO MEAS - EDV(CUBED): 157.5 ML
BH CV ECHO MEAS - EDV(TEICH): 141.3 ML
BH CV ECHO MEAS - EF(CUBED): 86.1 %
BH CV ECHO MEAS - EF(MOD-SP4): 65 %
BH CV ECHO MEAS - EF(TEICH): 79.1 %
BH CV ECHO MEAS - EPSS: 0.6 CM
BH CV ECHO MEAS - ESV(CUBED): 22 ML
BH CV ECHO MEAS - ESV(TEICH): 29.6 ML
BH CV ECHO MEAS - FS: 48.1 %
BH CV ECHO MEAS - IVS/LVPW: 1
BH CV ECHO MEAS - IVSD: 1.1 CM
BH CV ECHO MEAS - LA DIMENSION: 3.3 CM
BH CV ECHO MEAS - LA/AO: 0.87
BH CV ECHO MEAS - LV MASS(C)D: 234.8 GRAMS
BH CV ECHO MEAS - LV MASS(C)DI: 124.9 GRAMS/M^2
BH CV ECHO MEAS - LV MAX PG: 7.4 MMHG
BH CV ECHO MEAS - LV MEAN PG: 3 MMHG
BH CV ECHO MEAS - LV V1 MAX: 136 CM/SEC
BH CV ECHO MEAS - LV V1 MEAN: 83.4 CM/SEC
BH CV ECHO MEAS - LV V1 VTI: 27.6 CM
BH CV ECHO MEAS - LVIDD: 5.4 CM
BH CV ECHO MEAS - LVIDS: 2.8 CM
BH CV ECHO MEAS - LVPWD: 1.1 CM
BH CV ECHO MEAS - MR MAX PG: 81.7 MMHG
BH CV ECHO MEAS - MR MAX VEL: 452 CM/SEC
BH CV ECHO MEAS - MV A MAX VEL: 106 CM/SEC
BH CV ECHO MEAS - MV E MAX VEL: 133 CM/SEC
BH CV ECHO MEAS - MV E/A: 1.3
BH CV ECHO MEAS - PA MAX PG: 5.4 MMHG
BH CV ECHO MEAS - PA MEAN PG: 3 MMHG
BH CV ECHO MEAS - PA V2 MAX: 116 CM/SEC
BH CV ECHO MEAS - PA V2 MEAN: 83.5 CM/SEC
BH CV ECHO MEAS - PA V2 VTI: 23.7 CM
BH CV ECHO MEAS - PI END-D VEL: 86.9 CM/SEC
BH CV ECHO MEAS - RAP SYSTOLE: 10 MMHG
BH CV ECHO MEAS - RVDD: 2 CM
BH CV ECHO MEAS - RVSP: 31.9 MMHG
BH CV ECHO MEAS - SI(AO): 203.2 ML/M^2
BH CV ECHO MEAS - SI(CUBED): 72.1 ML/M^2
BH CV ECHO MEAS - SI(TEICH): 59.4 ML/M^2
BH CV ECHO MEAS - SV(AO): 382.2 ML
BH CV ECHO MEAS - SV(CUBED): 135.5 ML
BH CV ECHO MEAS - SV(TEICH): 111.8 ML
BH CV ECHO MEAS - TR MAX VEL: 233 CM/SEC
LV EF 2D ECHO EST: 55 %

## 2017-04-03 PROCEDURE — 93016 CV STRESS TEST SUPVJ ONLY: CPT | Performed by: INTERNAL MEDICINE

## 2017-04-03 PROCEDURE — 93017 CV STRESS TEST TRACING ONLY: CPT

## 2017-04-03 PROCEDURE — 93018 CV STRESS TEST I&R ONLY: CPT | Performed by: INTERNAL MEDICINE

## 2017-04-06 ENCOUNTER — OFFICE VISIT (OUTPATIENT)
Dept: FAMILY MEDICINE CLINIC | Facility: CLINIC | Age: 57
End: 2017-04-06

## 2017-04-06 ENCOUNTER — LAB (OUTPATIENT)
Dept: LAB | Facility: HOSPITAL | Age: 57
End: 2017-04-06

## 2017-04-06 VITALS
HEIGHT: 66 IN | OXYGEN SATURATION: 98 % | DIASTOLIC BLOOD PRESSURE: 80 MMHG | BODY MASS INDEX: 27.64 KG/M2 | WEIGHT: 172 LBS | SYSTOLIC BLOOD PRESSURE: 140 MMHG | HEART RATE: 84 BPM

## 2017-04-06 DIAGNOSIS — Z76.89 ENCOUNTER TO ESTABLISH CARE: Primary | ICD-10-CM

## 2017-04-06 DIAGNOSIS — Z13.220 SCREENING CHOLESTEROL LEVEL: ICD-10-CM

## 2017-04-06 DIAGNOSIS — Z23 NEED FOR PNEUMOCOCCAL VACCINATION: ICD-10-CM

## 2017-04-06 DIAGNOSIS — Z80.0 FAMILY HX OF COLON CANCER REQUIRING SCREENING COLONOSCOPY: ICD-10-CM

## 2017-04-06 DIAGNOSIS — Z23 NEED FOR INFLUENZA VACCINATION: ICD-10-CM

## 2017-04-06 DIAGNOSIS — Z23 NEED FOR TDAP VACCINATION: ICD-10-CM

## 2017-04-06 DIAGNOSIS — Z11.59 NEED FOR HEPATITIS C SCREENING TEST: ICD-10-CM

## 2017-04-06 LAB
ARTICHOKE IGE QN: 62 MG/DL (ref 1–129)
CHOLEST SERPL-MCNC: 230 MG/DL (ref 0–199)
HDLC SERPL-MCNC: 133 MG/DL (ref 60–200)
LDLC/HDLC SERPL: 0.57 {RATIO} (ref 0–3.55)
TRIGL SERPL-MCNC: 107 MG/DL (ref 20–199)

## 2017-04-06 PROCEDURE — 36415 COLL VENOUS BLD VENIPUNCTURE: CPT

## 2017-04-06 PROCEDURE — 99214 OFFICE O/P EST MOD 30 MIN: CPT | Performed by: FAMILY MEDICINE

## 2017-04-06 PROCEDURE — 90715 TDAP VACCINE 7 YRS/> IM: CPT | Performed by: FAMILY MEDICINE

## 2017-04-06 PROCEDURE — 90686 IIV4 VACC NO PRSV 0.5 ML IM: CPT | Performed by: FAMILY MEDICINE

## 2017-04-06 PROCEDURE — 80061 LIPID PANEL: CPT | Performed by: FAMILY MEDICINE

## 2017-04-06 PROCEDURE — 90472 IMMUNIZATION ADMIN EACH ADD: CPT | Performed by: FAMILY MEDICINE

## 2017-04-06 PROCEDURE — 86803 HEPATITIS C AB TEST: CPT | Performed by: FAMILY MEDICINE

## 2017-04-06 PROCEDURE — 90732 PPSV23 VACC 2 YRS+ SUBQ/IM: CPT | Performed by: FAMILY MEDICINE

## 2017-04-06 PROCEDURE — 90471 IMMUNIZATION ADMIN: CPT | Performed by: FAMILY MEDICINE

## 2017-04-06 NOTE — PROGRESS NOTES
Subjective:     Gregg Echols is a 57 y.o. male who presents for initial evaluation for establishment of care. Has been diagnosed with chronic bronchitis and is seen by Dr. Liana Amador. Had an episode of chest pain and is being followed by Dr. Ambrosio. Has no other medical complaints. Is agreeable to Influenza, TDap, Pneumococcal vaccine. Is agreeable to Hep C screening. Is agreeable for colonoscopy referral. Current every day smoker, his insurance declined Chantix. Smoking cessation counseling provided.    Preventative:  Over the past 2 weeks, have you felt down, depressed, or hopeless?No   Over the past 2 weeks, have you felt little interest or pleasure in doing things?No  Clinical depression screening refused by patient.No     Past Medical Hx:  Past Medical History:   Diagnosis Date   • Calculus of gallbladder with acute cholecystitis without obstruction    • Chronic bronchitis    • Pneumonia        Past Surgical Hx:  Past Surgical History:   Procedure Laterality Date   • BACK SURGERY     • GALLBLADDER SURGERY  12/16/2015    Laparoscopic converted to open cholecystectomy       Health Maintenance:  Health Maintenance   Topic Date Due   • COLONOSCOPY  03/29/2017   • TDAP/TD VACCINES (2 - Td) 04/06/2027   • PNEUMOCOCCAL VACCINE (19-64 MEDIUM RISK)  Completed   • HEPATITIS C SCREENING  Completed   • INFLUENZA VACCINE  Completed       Current Meds:    Current Outpatient Prescriptions:   •  albuterol (PROVENTIL HFA;VENTOLIN HFA) 108 (90 BASE) MCG/ACT inhaler, Inhale 2 puffs Every 4 (Four) Hours As Needed for Wheezing or Shortness of Air., Disp: 1 inhaler, Rfl: 11  •  fluticasone (FLONASE) 50 MCG/ACT nasal spray, 2 sprays into each nostril Daily for 30 days., Disp: 1 each, Rfl: 0  •  Umeclidinium-Vilanterol 62.5-25 MCG/INH aerosol powder , Inhale 1 puff Daily., Disp: 1 each, Rfl: 11  No current facility-administered medications for this visit.     Allergies:  Review of patient's allergies indicates no known  "allergies.    Family Hx:  Family History   Problem Relation Age of Onset   • Cancer Mother    • Cancer Father    • Diabetes Father    • Emphysema Father    • Cancer Brother    • Heart failure Brother         Social History:  Social History     Social History   • Marital status: Legally      Spouse name: N/A   • Number of children: N/A   • Years of education: N/A     Occupational History   • Not on file.     Social History Main Topics   • Smoking status: Current Every Day Smoker     Packs/day: 2.00     Types: Cigarettes   • Smokeless tobacco: Not on file   • Alcohol use Yes      Comment: occasional   • Drug use: No   • Sexual activity: Defer     Other Topics Concern   • Not on file     Social History Narrative       Review of Systems  Review of Systems   Constitutional: Negative.  Negative for fatigue and fever.   HENT: Negative.  Negative for ear pain and sore throat.    Eyes: Negative.  Negative for pain and visual disturbance.   Respiratory: Negative.  Negative for cough and shortness of breath.    Cardiovascular: Negative.  Negative for chest pain and palpitations.   Gastrointestinal: Negative for abdominal pain and nausea.   Endocrine: Negative.    Genitourinary: Negative for dysuria.   Musculoskeletal: Negative for arthralgias.   Skin: Negative for rash.   Allergic/Immunologic: Negative.    Neurological: Negative for dizziness, weakness and headaches.   Psychiatric/Behavioral: Negative for sleep disturbance.         Objective:     /80  Pulse 84  Ht 66\" (167.6 cm)  Wt 172 lb (78 kg)  SpO2 98%  BMI 27.76 kg/m2        Physical Exam   Constitutional: He is oriented to person, place, and time. He appears well-developed and well-nourished. No distress.   HENT:   Head: Normocephalic and atraumatic.   Right Ear: External ear normal.   Left Ear: External ear normal.   Nose: Nose normal.   Mouth/Throat: Oropharynx is clear and moist.   Eyes: Conjunctivae and EOM are normal. Pupils are equal, round, " and reactive to light. Right eye exhibits no discharge. Left eye exhibits no discharge. No scleral icterus.   Neck: Normal range of motion. Neck supple. No tracheal deviation present. No thyromegaly present.   Cardiovascular: Normal rate, regular rhythm, normal heart sounds and intact distal pulses.  Exam reveals no gallop and no friction rub.    No murmur heard.  Pulmonary/Chest: Effort normal and breath sounds normal. No respiratory distress. He has no wheezes. He has no rales. He exhibits no tenderness.   Abdominal: Soft. Bowel sounds are normal. He exhibits no distension and no mass. There is no tenderness. There is no guarding. No hernia.   Musculoskeletal: Normal range of motion. He exhibits no edema, tenderness or deformity.   Neurological: He is alert and oriented to person, place, and time.   Skin: Skin is warm and dry. No rash noted. He is not diaphoretic. No erythema.   Psychiatric: He has a normal mood and affect. His behavior is normal. Judgment and thought content normal.   Nursing note and vitals reviewed.                                                               Assessment/Plan:     Diagnoses and all orders for this visit:    Encounter to establish care    Need for influenza vaccination  -     Flu Vaccine 3 Yr Plus QuadValent PF    Need for Tdap vaccination  -     Tdap Vaccine Greater Than or Equal To 8yo IM    Need for pneumococcal vaccination  -     Discontinue: pneumococcal conj. 13-valent (PREVNAR-13) vaccine 0.5 mL; Inject 0.5 mL into the shoulder, thigh, or buttocks 1 (One) Time.    Family hx of colon cancer requiring screening colonoscopy  -     Ambulatory Referral For Screening Colonoscopy    Need for hepatitis C screening test  -     Hepatitis C antibody; Future    Screening cholesterol level  -     Lipid panel; Future         Follow-up:     Return in about 6 months (around 10/6/2017) for Next scheduled follow up.        GOALS:  Maintain medication compliance.    Preventative:  Male  Preventative: Exercises regularly  Cholesterol screening up to date  Vaccines:   Tetanus vaccine: up to date  Annual influenza vaccine: up to date   Pneumococcal vaccine: up to date    Smoking cessation counseling was provided.  occasional/rare  eat more fruits and vegetables, decrease soda or juice intake, increase water intake and increase physical activity    RISK SCORE: 3        This document has been electronically signed by Mitch Kamara MD on April 6, 2017 11:20 AM

## 2017-04-07 LAB — HCV AB SER DONR QL: NEGATIVE

## 2017-04-08 RX ORDER — SIMVASTATIN 20 MG
20 TABLET ORAL NIGHTLY
Qty: 30 TABLET | Refills: 3 | Status: SHIPPED | OUTPATIENT
Start: 2017-04-08 | End: 2017-05-31 | Stop reason: SDUPTHER

## 2017-04-10 LAB
BH CV STRESS BP STAGE 1: NORMAL
BH CV STRESS BP STAGE 2: NORMAL
BH CV STRESS BP STAGE 3: NORMAL
BH CV STRESS DURATION MIN STAGE 1: 3
BH CV STRESS DURATION MIN STAGE 2: 3
BH CV STRESS DURATION MIN STAGE 3: 3
BH CV STRESS DURATION SEC STAGE 2: 0
BH CV STRESS DURATION SEC STAGE 3: 0
BH CV STRESS GRADE STAGE 1: 10
BH CV STRESS GRADE STAGE 2: 12
BH CV STRESS GRADE STAGE 3: 14
BH CV STRESS HR STAGE 1: 112
BH CV STRESS HR STAGE 2: 129
BH CV STRESS HR STAGE 3: 148
BH CV STRESS METS STAGE 1: 5
BH CV STRESS METS STAGE 2: 7.5
BH CV STRESS METS STAGE 3: 10
BH CV STRESS PROTOCOL 1: NORMAL
BH CV STRESS RECOVERY BP: NORMAL MMHG
BH CV STRESS RECOVERY HR: 85 BPM
BH CV STRESS SPEED STAGE 1: 1.7
BH CV STRESS SPEED STAGE 2: 2.5
BH CV STRESS SPEED STAGE 3: 3.4
BH CV STRESS STAGE 1: 1
BH CV STRESS STAGE 2: 2
BH CV STRESS STAGE 3: 3
MAXIMAL PREDICTED HEART RATE: 161 BPM
PERCENT MAX PREDICTED HR: 91.93 %
STRESS BASELINE BP: NORMAL MMHG
STRESS BASELINE HR: 90 BPM
STRESS PERCENT HR: 108 %
STRESS POST ESTIMATED WORKLOAD: 10.1 METS
STRESS POST EXERCISE DUR MIN: 8 MIN
STRESS POST EXERCISE DUR SEC: 59 SEC
STRESS POST PEAK BP: NORMAL MMHG
STRESS POST PEAK HR: 148 BPM
STRESS TARGET HR: 137 BPM

## 2017-04-11 ENCOUNTER — DOCUMENTATION (OUTPATIENT)
Dept: CARDIOLOGY | Facility: CLINIC | Age: 57
End: 2017-04-11

## 2017-04-11 DIAGNOSIS — Z12.11 SCREEN FOR COLON CANCER: Primary | ICD-10-CM

## 2017-04-11 RX ORDER — DEXTROSE AND SODIUM CHLORIDE 5; .45 G/100ML; G/100ML
100 INJECTION, SOLUTION INTRAVENOUS CONTINUOUS
Status: CANCELLED | OUTPATIENT
Start: 2017-04-11

## 2017-04-12 ENCOUNTER — HOSPITAL ENCOUNTER (OUTPATIENT)
Facility: HOSPITAL | Age: 57
Setting detail: HOSPITAL OUTPATIENT SURGERY
End: 2017-04-12
Attending: SURGERY | Admitting: SURGERY

## 2017-05-31 ENCOUNTER — OFFICE VISIT (OUTPATIENT)
Dept: PULMONOLOGY | Facility: CLINIC | Age: 57
End: 2017-05-31

## 2017-05-31 VITALS
SYSTOLIC BLOOD PRESSURE: 120 MMHG | OXYGEN SATURATION: 98 % | BODY MASS INDEX: 28.24 KG/M2 | WEIGHT: 175.7 LBS | DIASTOLIC BLOOD PRESSURE: 70 MMHG | HEART RATE: 88 BPM | HEIGHT: 66 IN

## 2017-05-31 DIAGNOSIS — J31.0 CHRONIC RHINITIS: ICD-10-CM

## 2017-05-31 DIAGNOSIS — F17.200 TOBACCO USE DISORDER: ICD-10-CM

## 2017-05-31 DIAGNOSIS — J41.0 SIMPLE CHRONIC BRONCHITIS (HCC): Primary | ICD-10-CM

## 2017-05-31 PROCEDURE — 99214 OFFICE O/P EST MOD 30 MIN: CPT | Performed by: INTERNAL MEDICINE

## 2017-05-31 PROCEDURE — 99406 BEHAV CHNG SMOKING 3-10 MIN: CPT | Performed by: INTERNAL MEDICINE

## 2017-05-31 RX ORDER — FLUTICASONE PROPIONATE 50 MCG
2 SPRAY, SUSPENSION (ML) NASAL DAILY
Qty: 1 EACH | Refills: 11 | Status: SHIPPED | OUTPATIENT
Start: 2017-05-31 | End: 2017-06-30

## 2017-05-31 RX ORDER — SIMVASTATIN 20 MG
20 TABLET ORAL NIGHTLY
Qty: 30 TABLET | Refills: 9 | Status: SHIPPED | OUTPATIENT
Start: 2017-05-31 | End: 2017-06-01 | Stop reason: SDUPTHER

## 2017-05-31 RX ORDER — NICOTINE 21 MG/24HR
1 PATCH, TRANSDERMAL 24 HOURS TRANSDERMAL EVERY 24 HOURS
Qty: 28 PATCH | Refills: 5 | Status: SHIPPED | OUTPATIENT
Start: 2017-05-31

## 2017-06-01 RX ORDER — SIMVASTATIN 20 MG
20 TABLET ORAL NIGHTLY
Qty: 30 TABLET | Refills: 9 | Status: SHIPPED | OUTPATIENT
Start: 2017-06-01 | End: 2017-07-05 | Stop reason: SDUPTHER

## 2017-07-05 RX ORDER — SIMVASTATIN 20 MG
TABLET ORAL
Qty: 30 TABLET | Refills: 3 | OUTPATIENT
Start: 2017-07-05

## 2017-07-05 RX ORDER — SIMVASTATIN 20 MG
20 TABLET ORAL NIGHTLY
Qty: 30 TABLET | Refills: 9 | Status: SHIPPED | OUTPATIENT
Start: 2017-07-05

## 2017-07-10 ENCOUNTER — HOSPITAL ENCOUNTER (EMERGENCY)
Facility: HOSPITAL | Age: 57
Discharge: HOME | End: 2017-07-10
Attending: EMERGENCY MEDICINE | Admitting: EMERGENCY MEDICINE

## 2017-07-10 VITALS
SYSTOLIC BLOOD PRESSURE: 133 MMHG | WEIGHT: 170 LBS | OXYGEN SATURATION: 94 % | DIASTOLIC BLOOD PRESSURE: 90 MMHG | TEMPERATURE: 99.2 F | HEART RATE: 82 BPM | BODY MASS INDEX: 27.32 KG/M2 | HEIGHT: 66 IN | RESPIRATION RATE: 18 BRPM

## 2017-07-10 DIAGNOSIS — M62.838 MUSCLE SPASMS OF NECK: Primary | ICD-10-CM

## 2017-07-10 PROCEDURE — 99283 EMERGENCY DEPT VISIT LOW MDM: CPT

## 2017-07-10 RX ORDER — CYCLOBENZAPRINE HCL 10 MG
10 TABLET ORAL 3 TIMES DAILY PRN
Qty: 15 TABLET | Refills: 0 | Status: SHIPPED | OUTPATIENT
Start: 2017-07-10 | End: 2017-07-20 | Stop reason: SDDI

## 2017-07-10 RX ORDER — CYCLOBENZAPRINE HCL 10 MG
10 TABLET ORAL ONCE
Status: COMPLETED | OUTPATIENT
Start: 2017-07-10 | End: 2017-07-10

## 2017-07-10 RX ORDER — MELOXICAM 15 MG/1
15 TABLET ORAL DAILY
Qty: 30 TABLET | Refills: 0 | Status: SHIPPED | OUTPATIENT
Start: 2017-07-10 | End: 2017-07-20 | Stop reason: CLARIF

## 2017-07-10 RX ADMIN — CYCLOBENZAPRINE HYDROCHLORIDE 10 MG: 10 TABLET, FILM COATED ORAL at 21:35

## 2017-07-11 NOTE — DISCHARGE INSTRUCTIONS

## 2017-07-11 NOTE — ED NOTES
Initial contact with pt. Pt. Sitting up on the side of the bed.  resps even and unlabored.  VSS.  NAD.  Pt. Alert and oriented x4.  No other needs voiced at this time.  WIll continue to monitor.       Jasmina Ireland RN  07/10/17 [id number]

## 2017-07-11 NOTE — ED PROVIDER NOTES
Subjective   History of Present Illness  57-year-old male presents with upper back pain started approximately 2 months ago.  This pain since that time.  He is complaining of pain above both of his scapula.  It radiates down his back to his buttock.  He denies any trauma.  No chest pain.  He does not have any shortness of breath.  Having nausea vomiting.  He does not have any any fevers.  He denies any medical problems.  Review of Systems   Constitutional: Negative for diaphoresis and fever.   HENT: Negative for congestion, nosebleeds, postnasal drip, sinus pressure and sore throat.    Eyes: Negative for pain.   Respiratory: Negative for cough, chest tightness, shortness of breath, wheezing and stridor.    Gastrointestinal: Negative for abdominal pain, constipation, diarrhea, nausea and vomiting.   Genitourinary: Negative for dysuria, flank pain, frequency, hematuria and urgency.   Musculoskeletal: Negative for arthralgias and myalgias.   Skin: Negative for rash.   Neurological: Negative for syncope, light-headedness and headaches.   Psychiatric/Behavioral: Negative.        Past Medical History:   Diagnosis Date   • Calculus of gallbladder with acute cholecystitis without obstruction    • Chronic bronchitis    • Pneumonia        No Known Allergies    Past Surgical History:   Procedure Laterality Date   • BACK SURGERY     • GALLBLADDER SURGERY  12/16/2015    Laparoscopic converted to open cholecystectomy       Family History   Problem Relation Age of Onset   • Cancer Mother    • Cancer Father    • Diabetes Father    • Emphysema Father    • Cancer Brother    • Heart failure Brother        Social History     Social History   • Marital status: Legally      Spouse name: N/A   • Number of children: N/A   • Years of education: N/A     Social History Main Topics   • Smoking status: Current Every Day Smoker     Packs/day: 1.00     Types: Cigarettes   • Smokeless tobacco: None   • Alcohol use Yes      Comment:  occasional   • Drug use: No   • Sexual activity: Defer     Other Topics Concern   • None     Social History Narrative   • None           Objective   Physical Exam   Constitutional: He is oriented to person, place, and time. He appears well-developed and well-nourished.   HENT:   Head: Normocephalic and atraumatic.   Eyes: Conjunctivae and EOM are normal. Pupils are equal, round, and reactive to light.   Neck: Normal range of motion. Neck supple.   Cardiovascular: Normal rate, regular rhythm, normal heart sounds and intact distal pulses.    Pulmonary/Chest: Effort normal and breath sounds normal.   Abdominal: Soft. He exhibits no distension. There is no tenderness. There is no rebound and no guarding.   Genitourinary: Penis normal.   Musculoskeletal: Normal range of motion.   Patient has to his palpation along the paraspinal muscles and the trapezius muscles were they attach to the scapula.  This reproduces his pain   Neurological: He is alert and oriented to person, place, and time.   Skin: Skin is warm and dry.   Psychiatric: He has a normal mood and affect.   Nursing note and vitals reviewed.      Procedures         ED Course  ED Course                  MDM  Number of Diagnoses or Management Options  Diagnosis management comments: Patient with musculoskeletal spasms and pain.  Pressing on his trapezius and paraspinous muscle reproduces pain.  We will start him on Flexeril and give him Motrin.  He is to call his primary care clinic this week if not improving.  It is no indication for labs imaging or workup at this time.      Final diagnoses:   Muscle spasms of neck            Ulises Desai MD  07/10/17 [id number]

## 2017-07-11 NOTE — ED NOTES
Discharge instructions reviewed with no additional questions at this time. Scripts x2.  NAD.  VSS.  Pt. Alert and oriented x4. No other needs voiced at this time.  Resps even and unlabored.  VSS.  Pt. Ambulatory to lobby with steady gait refusing wheelchair at this time.  Pt. Left ED with friend who is responsible .        Jasmina Ireland RN  07/10/17 [id number]

## 2017-07-15 ENCOUNTER — HOSPITAL ENCOUNTER (EMERGENCY)
Facility: HOSPITAL | Age: 57
Discharge: HOME | End: 2017-07-15
Attending: FAMILY MEDICINE | Admitting: NURSE PRACTITIONER

## 2017-07-15 ENCOUNTER — APPOINTMENT (OUTPATIENT)
Dept: GENERAL RADIOLOGY | Facility: HOSPITAL | Age: 57
End: 2017-07-15

## 2017-07-15 VITALS
OXYGEN SATURATION: 97 % | SYSTOLIC BLOOD PRESSURE: 139 MMHG | TEMPERATURE: 97.7 F | WEIGHT: 170 LBS | BODY MASS INDEX: 27.32 KG/M2 | RESPIRATION RATE: 16 BRPM | DIASTOLIC BLOOD PRESSURE: 88 MMHG | HEIGHT: 66 IN | HEART RATE: 93 BPM

## 2017-07-15 DIAGNOSIS — M54.9 OTHER CHRONIC BACK PAIN: Primary | ICD-10-CM

## 2017-07-15 DIAGNOSIS — G89.29 OTHER CHRONIC BACK PAIN: Primary | ICD-10-CM

## 2017-07-15 DIAGNOSIS — M50.10 CERVICAL RADICULOPATHY DUE TO INTERVERTEBRAL DISC DISORDER: ICD-10-CM

## 2017-07-15 PROCEDURE — 25010000002 KETOROLAC TROMETHAMINE PER 15 MG: Performed by: NURSE PRACTITIONER

## 2017-07-15 PROCEDURE — 25010000002 ORPHENADRINE CITRATE PER 60 MG: Performed by: NURSE PRACTITIONER

## 2017-07-15 PROCEDURE — 72040 X-RAY EXAM NECK SPINE 2-3 VW: CPT

## 2017-07-15 PROCEDURE — 72100 X-RAY EXAM L-S SPINE 2/3 VWS: CPT

## 2017-07-15 PROCEDURE — 99283 EMERGENCY DEPT VISIT LOW MDM: CPT

## 2017-07-15 PROCEDURE — 96372 THER/PROPH/DIAG INJ SC/IM: CPT

## 2017-07-15 RX ORDER — TIZANIDINE HYDROCHLORIDE 2 MG/1
2 CAPSULE, GELATIN COATED ORAL 3 TIMES DAILY
Qty: 21 CAPSULE | Refills: 0 | Status: SHIPPED | OUTPATIENT
Start: 2017-07-15 | End: 2017-07-22

## 2017-07-15 RX ORDER — KETOROLAC TROMETHAMINE 30 MG/ML
60 INJECTION, SOLUTION INTRAMUSCULAR; INTRAVENOUS ONCE
Status: COMPLETED | OUTPATIENT
Start: 2017-07-15 | End: 2017-07-15

## 2017-07-15 RX ORDER — ORPHENADRINE CITRATE 30 MG/ML
60 INJECTION INTRAMUSCULAR; INTRAVENOUS ONCE
Status: COMPLETED | OUTPATIENT
Start: 2017-07-15 | End: 2017-07-15

## 2017-07-15 RX ORDER — HYDROCODONE BITARTRATE AND ACETAMINOPHEN 5; 325 MG/1; MG/1
1 TABLET ORAL EVERY 6 HOURS PRN
Qty: 15 TABLET | Refills: 0 | Status: SHIPPED | OUTPATIENT
Start: 2017-07-15 | End: 2017-08-22

## 2017-07-15 RX ADMIN — ORPHENADRINE CITRATE 60 MG: 30 INJECTION INTRAMUSCULAR; INTRAVENOUS at 13:41

## 2017-07-15 RX ADMIN — KETOROLAC TROMETHAMINE 60 MG: 60 INJECTION, SOLUTION INTRAMUSCULAR at 13:41

## 2017-07-15 NOTE — ED PROVIDER NOTES
Subjective   HPI Comments: Pt reports to ED c/o neck pain that radiates to both shoulders and down his back. He reports he has had this pain for 2 months and it is not getting better. He has not had any films. The flexeril he was recently taking did not work. He is unable to sleep at night     Patient is a 57 y.o. male presenting with back pain.   History provided by:  Patient and friend  Back Pain   Location:  Lumbar spine and thoracic spine (cervical radiating to thoracic )  Quality:  Burning, cramping and stabbing  Associated symptoms: no abdominal pain, no chest pain, no fever, no headaches and no numbness        Review of Systems   Constitutional: Positive for activity change. Negative for fatigue and fever.   Eyes: Negative.    Respiratory: Negative for cough, chest tightness and shortness of breath.    Cardiovascular: Negative for chest pain, palpitations and leg swelling.   Gastrointestinal: Negative for abdominal pain, nausea and vomiting.   Endocrine: Negative.    Genitourinary: Negative.    Musculoskeletal: Positive for arthralgias, back pain, neck pain and neck stiffness.   Skin: Negative.    Neurological: Negative for dizziness, light-headedness, numbness and headaches.   All other systems reviewed and are negative.      Past Medical History:   Diagnosis Date   • Calculus of gallbladder with acute cholecystitis without obstruction    • Chronic bronchitis    • Pneumonia        No Known Allergies    Past Surgical History:   Procedure Laterality Date   • BACK SURGERY     • GALLBLADDER SURGERY  12/16/2015    Laparoscopic converted to open cholecystectomy       Family History   Problem Relation Age of Onset   • Cancer Mother    • Cancer Father    • Diabetes Father    • Emphysema Father    • Cancer Brother    • Heart failure Brother        Social History     Social History   • Marital status: Legally      Spouse name: N/A   • Number of children: N/A   • Years of education: N/A     Social History Main  Topics   • Smoking status: Current Every Day Smoker     Packs/day: 1.00     Types: Cigarettes   • Smokeless tobacco: None   • Alcohol use Yes      Comment: occasional   • Drug use: No   • Sexual activity: Defer     Other Topics Concern   • None     Social History Narrative           Objective   Physical Exam   Constitutional: He is oriented to person, place, and time. He appears well-developed and well-nourished.   HENT:   Head: Normocephalic.   Neck: Trachea normal. Muscular tenderness present. Rigidity present. Decreased range of motion present.   Cardiovascular: Normal rate, regular rhythm, S1 normal, S2 normal and normal heart sounds.    No murmur heard.  Pulmonary/Chest: Effort normal and breath sounds normal.   Abdominal: Normal appearance.   Musculoskeletal:        Cervical back: He exhibits decreased range of motion, tenderness, pain and spasm. He exhibits no swelling, no edema and no deformity.        Thoracic back: He exhibits tenderness, pain and spasm. He exhibits no edema, no deformity and no laceration.   Neurological: He is alert and oriented to person, place, and time. He has normal strength. GCS eye subscore is 4. GCS verbal subscore is 5. GCS motor subscore is 6.   Skin: Skin is warm and dry.   Psychiatric: He has a normal mood and affect. His speech is normal and behavior is normal. Judgment normal.   Nursing note and vitals reviewed.      Procedures         ED Course  ED Course                  MDM    Final diagnoses:   Other chronic back pain   Cervical radiculopathy due to intervertebral disc disorder            Keyshawn Gardner, APRN  07/15/17 [id number]

## 2017-07-15 NOTE — DISCHARGE INSTRUCTIONS
.Return to ED if s/s worsening or pain increases. Contact PCP or return to ED for further evaluation if needed.

## 2017-07-20 ENCOUNTER — OFFICE VISIT (OUTPATIENT)
Dept: FAMILY MEDICINE CLINIC | Facility: CLINIC | Age: 57
End: 2017-07-20

## 2017-07-20 VITALS
WEIGHT: 171 LBS | SYSTOLIC BLOOD PRESSURE: 120 MMHG | BODY MASS INDEX: 27.48 KG/M2 | HEIGHT: 66 IN | DIASTOLIC BLOOD PRESSURE: 78 MMHG | HEART RATE: 88 BPM

## 2017-07-20 DIAGNOSIS — M54.2 NECK PAIN: Primary | ICD-10-CM

## 2017-07-20 DIAGNOSIS — G89.29 CHRONIC MIDLINE LOW BACK PAIN WITHOUT SCIATICA: ICD-10-CM

## 2017-07-20 DIAGNOSIS — M54.50 CHRONIC MIDLINE LOW BACK PAIN WITHOUT SCIATICA: ICD-10-CM

## 2017-07-20 PROCEDURE — 99213 OFFICE O/P EST LOW 20 MIN: CPT | Performed by: FAMILY MEDICINE

## 2017-07-20 RX ORDER — CYCLOBENZAPRINE HCL 10 MG
10 TABLET ORAL 3 TIMES DAILY PRN
Qty: 20 TABLET | Refills: 0 | Status: SHIPPED | OUTPATIENT
Start: 2017-07-20 | End: 2017-07-21 | Stop reason: SDUPTHER

## 2017-07-20 NOTE — PROGRESS NOTES
Subjective:     Gregg Echols is a 57 y.o. male who presents for follow up for chronic worsening neck and low back pain. Pt was recently seen in the ED and XR showed degenerative changes in cervical and lumbar spine. Pt states the pain in his neck shoots down to his shoulder blades. Pt has no decrease in range of motion. Pt states his lower back pain is nonradiating. These have been getting worse over the last several months. Pt states he has been taking his mobic as prescribed but does not think it works well. Pt is agreeable to changing to diclofenac at this time. Pt is agreeable to physical therapy and pain clinic referral. Pt is planning on rescheduling his colonoscopy. Pt does not need refills at this time.    Preventative:  Over the past 2 weeks, have you felt down, depressed, or hopeless?No   Over the past 2 weeks, have you felt little interest or pleasure in doing things?No  Clinical depression screening refused by patient.No     Past Medical Hx:  Past Medical History:   Diagnosis Date   • Calculus of gallbladder with acute cholecystitis without obstruction    • Chronic bronchitis    • Pneumonia        Past Surgical Hx:  Past Surgical History:   Procedure Laterality Date   • BACK SURGERY     • GALLBLADDER SURGERY  12/16/2015    Laparoscopic converted to open cholecystectomy       Health Maintenance:  Health Maintenance   Topic Date Due   • COLONOSCOPY  03/29/2017   • INFLUENZA VACCINE  08/01/2017   • TDAP/TD VACCINES (2 - Td) 04/06/2027   • PNEUMOCOCCAL VACCINE (19-64 MEDIUM RISK)  Completed   • HEPATITIS C SCREENING  Completed       Current Meds:    Current Outpatient Prescriptions:   •  albuterol (PROVENTIL HFA;VENTOLIN HFA) 108 (90 BASE) MCG/ACT inhaler, Inhale 2 puffs Every 4 (Four) Hours As Needed for Wheezing or Shortness of Air., Disp: 1 inhaler, Rfl: 11  •  cyclobenzaprine (FLEXERIL) 10 MG tablet, Take 1 tablet by mouth 3 (Three) Times a Day As Needed for Muscle Spasms., Disp: 20 tablet, Rfl: 0  •   HYDROcodone-acetaminophen (NORCO) 5-325 MG per tablet, Take 1 tablet by mouth Every 6 (Six) Hours As Needed (pain)., Disp: 15 tablet, Rfl: 0  •  nicotine (EQ NICOTINE) 21 MG/24HR patch, Place 1 patch on the skin Daily., Disp: 28 patch, Rfl: 5  •  simvastatin (ZOCOR) 20 MG tablet, Take 1 tablet by mouth Every Night., Disp: 30 tablet, Rfl: 9  •  TiZANidine (ZANAFLEX) 2 MG capsule, Take 1 capsule by mouth 3 (Three) Times a Day for 7 days., Disp: 21 capsule, Rfl: 0  •  Umeclidinium-Vilanterol 62.5-25 MCG/INH aerosol powder , Inhale 1 puff Daily., Disp: 1 each, Rfl: 11  •  diclofenac (VOLTAREN) 50 MG EC tablet, Take 1 tablet by mouth 2 (Two) Times a Day., Disp: 60 tablet, Rfl: 1    Allergies:  Review of patient's allergies indicates no known allergies.    Family Hx:  Family History   Problem Relation Age of Onset   • Cancer Mother    • Cancer Father    • Diabetes Father    • Emphysema Father    • Cancer Brother    • Heart failure Brother         Social History:  Social History     Social History   • Marital status: Legally      Spouse name: N/A   • Number of children: N/A   • Years of education: N/A     Occupational History   • Not on file.     Social History Main Topics   • Smoking status: Current Every Day Smoker     Packs/day: 1.00     Types: Cigarettes   • Smokeless tobacco: Not on file   • Alcohol use Yes      Comment: occasional   • Drug use: No   • Sexual activity: Defer     Other Topics Concern   • Not on file     Social History Narrative       Review of Systems  Review of Systems   Constitutional: Negative for chills and fever.   HENT: Negative for congestion and sore throat.    Eyes: Negative for pain, discharge, redness and itching.   Respiratory: Negative for cough, shortness of breath, wheezing and stridor.    Cardiovascular: Negative for chest pain, palpitations and leg swelling.   Gastrointestinal: Negative for abdominal pain, blood in stool, constipation, diarrhea, nausea and vomiting.  "  Genitourinary: Negative for dysuria, flank pain and hematuria.   Musculoskeletal: Positive for back pain (chronic low back) and neck pain (chronic). Negative for neck stiffness.   Skin: Negative for rash and wound.   Neurological: Negative for seizures, syncope and headaches.   Psychiatric/Behavioral: Negative for agitation and confusion.         Objective:     /78  Pulse 88  Ht 66\" (167.6 cm)  Wt 171 lb (77.6 kg)  BMI 27.6 kg/m2  Physical Exam   Constitutional: He is oriented to person, place, and time. He appears well-developed and well-nourished. No distress.   HENT:   Head: Normocephalic and atraumatic.   Right Ear: External ear normal.   Left Ear: External ear normal.   Nose: Nose normal.   Eyes: Conjunctivae are normal. Right eye exhibits no discharge. Left eye exhibits no discharge. No scleral icterus.   Neck: Normal range of motion. Neck supple. No tracheal deviation present. No thyromegaly present.   Cardiovascular: Normal rate, regular rhythm and normal heart sounds.  Exam reveals no gallop and no friction rub.    No murmur heard.  Pulmonary/Chest: Effort normal and breath sounds normal. No respiratory distress. He has no wheezes. He has no rales.   Abdominal: Soft. Bowel sounds are normal. He exhibits no distension. There is no tenderness.   Musculoskeletal: Normal range of motion. He exhibits no edema or deformity.   Neurological: He is alert and oriented to person, place, and time.   Skin: Skin is warm and dry. He is not diaphoretic.   Psychiatric: He has a normal mood and affect. His behavior is normal. Judgment and thought content normal.   Nursing note and vitals reviewed.                                                                     Assessment/Plan:     Diagnoses and all orders for this visit:    Neck pain  -     diclofenac (VOLTAREN) 50 MG EC tablet; Take 1 tablet by mouth 2 (Two) Times a Day.  -     cyclobenzaprine (FLEXERIL) 10 MG tablet; Take 1 tablet by mouth 3 (Three) Times a " Day As Needed for Muscle Spasms.  -     Ambulatory Referral to Pain Management  -     Ambulatory Referral to Physical Therapy Evaluate and treat    Chronic midline low back pain without sciatica  -     diclofenac (VOLTAREN) 50 MG EC tablet; Take 1 tablet by mouth 2 (Two) Times a Day.  -     cyclobenzaprine (FLEXERIL) 10 MG tablet; Take 1 tablet by mouth 3 (Three) Times a Day As Needed for Muscle Spasms.  -     Ambulatory Referral to Pain Management  -     Ambulatory Referral to Physical Therapy Evaluate and treat       Follow-up:     Return in about 2 months (around 9/20/2017) for Next scheduled follow up back and neck pain.        GOALS:  Maintain medication compliance    Preventative:  Male Preventative: Cholesterol screening up to date  Vaccines:   Tetanus vaccine: up to date  Annual influenza vaccine: up to date   Pneumococcal vaccine: up to date    Smoking cessation counseling was provided.  regular (moderate)  eat more fruits and vegetables, decrease soda or juice intake, increase water intake and increase physical activity    RISK SCORE: 3    Mitch Kamara MD PGY2  [place] [address]  Office: [phone]      This document has been electronically signed by Mitch Kamara MD on July 20, 2017 4:50 PM

## 2017-07-21 ENCOUNTER — TELEPHONE (OUTPATIENT)
Dept: FAMILY MEDICINE CLINIC | Facility: CLINIC | Age: 57
End: 2017-07-21

## 2017-07-21 DIAGNOSIS — M54.50 CHRONIC MIDLINE LOW BACK PAIN WITHOUT SCIATICA: ICD-10-CM

## 2017-07-21 DIAGNOSIS — M54.2 NECK PAIN: ICD-10-CM

## 2017-07-21 DIAGNOSIS — G89.29 CHRONIC MIDLINE LOW BACK PAIN WITHOUT SCIATICA: ICD-10-CM

## 2017-07-21 RX ORDER — CYCLOBENZAPRINE HCL 10 MG
10 TABLET ORAL 3 TIMES DAILY PRN
Qty: 20 TABLET | Refills: 0 | Status: SHIPPED | OUTPATIENT
Start: 2017-07-21 | End: 2017-07-28 | Stop reason: SDUPTHER

## 2017-07-21 NOTE — PROGRESS NOTES
I have reviewed the notes, assessments, and/or procedures performed. I concur with her/his documentation of Gregg Echols.     Nicolas Brar, DO

## 2017-07-28 ENCOUNTER — TELEPHONE (OUTPATIENT)
Dept: FAMILY MEDICINE CLINIC | Facility: CLINIC | Age: 57
End: 2017-07-28

## 2017-07-28 DIAGNOSIS — M54.50 CHRONIC MIDLINE LOW BACK PAIN WITHOUT SCIATICA: ICD-10-CM

## 2017-07-28 DIAGNOSIS — G89.29 CHRONIC MIDLINE LOW BACK PAIN WITHOUT SCIATICA: ICD-10-CM

## 2017-07-28 DIAGNOSIS — M54.2 NECK PAIN: ICD-10-CM

## 2017-07-28 RX ORDER — CYCLOBENZAPRINE HCL 10 MG
10 TABLET ORAL 3 TIMES DAILY PRN
Qty: 30 TABLET | Refills: 0 | Status: SHIPPED | OUTPATIENT
Start: 2017-07-28 | End: 2017-07-28 | Stop reason: SDUPTHER

## 2017-07-28 RX ORDER — CYCLOBENZAPRINE HCL 10 MG
10 TABLET ORAL 3 TIMES DAILY PRN
Qty: 30 TABLET | Refills: 0 | Status: SHIPPED | OUTPATIENT
Start: 2017-07-28 | End: 2017-08-17 | Stop reason: SDUPTHER

## 2017-07-28 NOTE — TELEPHONE ENCOUNTER
07/28/17     Gregg Echols was seen in the clinic on July 20, 2017 by his PCP, Dr. Kamara. Patient was prescribed Flexeril 10mg PO TID PRN for muscle spasms. Pharmacy reports that they never received the electronic prescription. Resent prescription today.     Signature  May Pugh MD  [facility] Family Medicine Resident, PGY2        This document has been electronically signed by May Pugh MD on July 28, 2017 1:43 PM

## 2017-08-01 ENCOUNTER — TELEPHONE (OUTPATIENT)
Dept: FAMILY MEDICINE CLINIC | Facility: CLINIC | Age: 57
End: 2017-08-01

## 2017-08-01 DIAGNOSIS — G89.29 CHRONIC MIDLINE LOW BACK PAIN WITHOUT SCIATICA: Primary | ICD-10-CM

## 2017-08-01 DIAGNOSIS — M54.50 CHRONIC MIDLINE LOW BACK PAIN WITHOUT SCIATICA: Primary | ICD-10-CM

## 2017-08-01 RX ORDER — DICLOFENAC SODIUM 75 MG/1
75 TABLET, DELAYED RELEASE ORAL 2 TIMES DAILY
Qty: 60 TABLET | Refills: 1 | Status: SHIPPED | OUTPATIENT
Start: 2017-08-01 | End: 2017-08-08 | Stop reason: SINTOL

## 2017-08-07 ENCOUNTER — TELEPHONE (OUTPATIENT)
Dept: FAMILY MEDICINE CLINIC | Facility: CLINIC | Age: 57
End: 2017-08-07

## 2017-08-07 NOTE — TELEPHONE ENCOUNTER
"PT is having problems with his prescription where the dose was increased. The medication is VOLTAREN. It was increased from 50 to 75 and it is \"doubling him over\". Pt states he cannot take it anymore.    Please advise PT on what to do. Thanks.   "

## 2017-08-08 ENCOUNTER — TELEPHONE (OUTPATIENT)
Dept: FAMILY MEDICINE CLINIC | Facility: CLINIC | Age: 57
End: 2017-08-08

## 2017-08-08 RX ORDER — NABUMETONE 500 MG/1
500 TABLET, FILM COATED ORAL 2 TIMES DAILY PRN
Qty: 60 TABLET | Refills: 1 | Status: SHIPPED | OUTPATIENT
Start: 2017-08-08 | End: 2017-08-22

## 2017-08-08 NOTE — TELEPHONE ENCOUNTER
PATIENT CALLED LAST NIGHT AND THIS MORNING.  THE DICLOFENAC HAS MADE HIM VERY SICK. HE FELT LIKE HIS STOMACH WAS TWISTED AND ACTUALLY VOMITED UP BLOOD.    HE WOULD LIKE YOU TO CALL HIM, LEAVE A MESSAGE LIKE BEFORE AND HE WILL RETURN YOUR CALL.    THANK YOU.

## 2017-08-09 ENCOUNTER — HOSPITAL ENCOUNTER (OUTPATIENT)
Dept: PHYSICAL THERAPY | Facility: HOSPITAL | Age: 57
Setting detail: THERAPIES SERIES
Discharge: HOME | End: 2017-08-09
Attending: FAMILY MEDICINE

## 2017-08-09 DIAGNOSIS — G89.29 CHRONIC MIDLINE LOW BACK PAIN WITH SCIATICA, SCIATICA LATERALITY UNSPECIFIED: ICD-10-CM

## 2017-08-09 DIAGNOSIS — M54.2 CERVICALGIA: Primary | ICD-10-CM

## 2017-08-09 DIAGNOSIS — M54.40 CHRONIC MIDLINE LOW BACK PAIN WITH SCIATICA, SCIATICA LATERALITY UNSPECIFIED: ICD-10-CM

## 2017-08-09 PROCEDURE — 97162 PT EVAL MOD COMPLEX 30 MIN: CPT | Performed by: PHYSICAL THERAPIST

## 2017-08-10 NOTE — THERAPY EVALUATION
Outpatient Physical Therapy Ortho Initial Evaluation  [facility]     Patient Name: Gregg Echols  : 1960  MRN: [id number]  Today's Date: 2017      Visit Date: 2017      PT initial visit  Recert date 17  MD visit TBD      Severe Neck and Back pain        Patient Active Problem List   Diagnosis   • Chronic bronchitis   • Tobacco use disorder   • Chronic rhinitis   • Chest pain   • Low back pain   • Neck pain        Past Medical History:   Diagnosis Date   • Calculus of gallbladder with acute cholecystitis without obstruction    • Chronic bronchitis    • Pneumonia         Past Surgical History:   Procedure Laterality Date   • BACK SURGERY     • GALLBLADDER SURGERY  2015    Laparoscopic converted to open cholecystectomy       Visit Dx:     ICD-10-CM ICD-9-CM   1. Cervicalgia M54.2 723.1   2. Chronic midline low back pain with sciatica, sciatica laterality unspecified M54.40 724.2    G89.29 724.3     338.29     Outpatient Medications     albuterol (PROVENTIL HFA;VENTOLIN HFA) 108 (90 BASE) MCG/ACT inhaler      cyclobenzaprine (FLEXERIL) 10 MG tablet      HYDROcodone-acetaminophen (NORCO) 5-325 MG per tablet      nabumetone (RELAFEN) 500 MG tablet      nicotine (EQ NICOTINE) 21 MG/24HR patch      simvastatin (ZOCOR) 20 MG tablet      Umeclidinium-Vilanterol 62.5-25 MCG/INH aerosol powder                 Patient History       17 1700          History    Chief Complaint Pain;Numbness;Muscle weakness;Tingling  -KW      Type of Pain Neck pain  -KW      Date Current Problem(s) Began --   3 months ago  -KW      Previous treatment for THIS PROBLEM Medication  -KW      Patient/Caregiver Goals Relieve pain  -KW      Current Tobacco Use 1 pack  -KW      Smoking Status yes  -KW      Patient's Rating of General Health Good  -KW      Hand Dominance right-handed  -KW      Occupation/sports/leisure activities reading celphone  -KW      Patient seeing anyone else for problem(s)? No  -KW       What clinical tests have you had for this problem? X-ray  -KW      Results of Clinical Tests DDD  -KW      Pain     Pain Location Neck  -KW      Pain at Present 10  -KW      Pain at Best 6  -KW      Pain at Worst 10  -KW      Pain Frequency Constant/continuous  -KW      Pain Description Aching;Stabbing;Burning  -KW      What Performance Factors Make the Current Problem(s) WORSE? looking up  -KW      What Performance Factors Make the Current Problem(s) BETTER? normal posture, heat  -KW      Is your sleep disturbed? Yes  -KW      Is medication used to assist with sleep? No  -KW      What position do you sleep in? Left sidelying  -KW      Difficulties at work? yes  -KW      Difficulties with ADL's? yes  -KW      Difficulties with recreational activities? yes  -KW      Fall Risk Assessment    Any falls in the past year: No  -KW      Services    Prior Rehab/Home Health Experiences No  -KW      Daily Activities    Primary Language English  -KW      Safety    Are you being hurt, hit, or frightened by anyone at home or in your life? No  -KW        User Key  (r) = Recorded By, (t) = Taken By, (c) = Cosigned By    Initials Name Provider Type    KW Awa Paez, PT Physical Therapist                PT Ortho       08/09/17 1700    Subjective Pain    Able to rate subjective pain? yes  -KW    Pre-Treatment Pain Level 10  -KW    Post-Treatment Pain Level 10  -KW    Posture/Observations    Alignment Options Forward head;Rounded shoulders  -KW    Forward Head Severe  -KW    Rounded Shoulders Moderate  -KW    Myotomal Screen- Upper Quarter Clearing    Shoulder flexion (C5) 4+ (Good +)  -KW    Elbow flexion/wrist extension (C6) 4+ (Good +)  -KW    Elbow extension/wrist flexion (C7) 4+ (Good +)  -KW    Finger flexion/ (C8) 4+ (Good +)  -KW    Finger abduction (T1) 4+ (Good +)  -KW    Cervical/Shoulder ROM Screen    Cervical flexion Normal  -KW    Cervical extension Impaired  -KW    Cervical lateral flexion Impaired  -KW     Cervical rotation Impaired  -KW    Myotomal Screen- Lower Quarter Clearing    Hip flexion (L2) 5 (Normal)  -KW    Knee extension (L3) 5 (Normal)  -KW    Ankle DF (L4) 5 (Normal)  -KW    Ankle PF (S1) 5 (Normal)  -KW    Knee flexion (S2) 5 (Normal)  -KW    Lumbar ROM Screen- Lower Quarter Clearing    Lumbar Flexion Normal  -KW    Lumbar Extension Normal  -KW    Lumbar Lateral Flexion Normal  -KW    Lumbar Rotation Normal  -KW    Cervical Palpation    Cervical Palpation- Location? Levator scapula;Upper traps;Lower traps;Scalenes;SCM;Sternum;Ribs  -KW    SCM Bilateral:;Tender  -KW    Scalenes Bilateral:;Tender  -KW    Levator Scapula Bilateral:;Tender  -KW    Upper Traps Bilateral:;Tender  -KW    Lower Traps Bilateral:;Tender  -KW    Ribs Bilateral:;Tender  -KW    Sternum Tender  -KW    Lumbosacral Palpation    SI Right:;Tender;Elicits spasm;Trigger point  -KW    Lumbosacral Segment Bilateral:;Tender  -KW    Thoracolumbar Segment Bilateral:;Tender  -KW    Erector Spinae (Paraspinals) Bilateral:;Tender  -KW    Lumbosacral Palpation? Yes  -KW    Shoulder Girdle Palpation    Supraspinatus Insertion Bilateral:;Tender;Guarded/taut  -KW    Infraspinatus Bilateral:;Tender;Guarded/taut  -KW    Shoulder Girdle Palpation? Yes  -KW    ROM (Range of Motion)    General ROM Detail CS flex 0-40, bilat rot 0-20, bilat SB 10, ext unable to do  -KW    MMT (Manual Muscle Testing)    General MMT Assessment Detail bilat shoulder flex and abd 4+/5  -KW      User Key  (r) = Recorded By, (t) = Taken By, (c) = Cosigned By    Initials Name Provider Type    BRAYAN Paez, DEVONTE Physical Therapist                            Therapy Education       08/09/17 9489          Therapy Education    Given Pain management;Symptoms/condition management;Posture/body mechanics  -KW      Program New  -KW        User Key  (r) = Recorded By, (t) = Taken By, (c) = Cosigned By    Initials Name Provider Type    BRAYAN Paez, PT Physical Therapist                 PT OP Goals       08/09/17 1800       PT Short Term Goals    STG Date to Achieve --   No STG  -KW     Long Term Goals    LTG Date to Achieve 08/30/17  -KW     LTG 1 decrease neck and back pain to 6/10  -KW     LTG 2 Improve forward head posture to minimum  -KW     LTG 3 Decrease NDI score to 50%  -KW     LTG 4 Improve cervical Rot bilat to 0-60 degrees  -KW     LTG 5 Improve cervical SB bilat to 0-45 degrees  -KW     Time Calculation    PT Goal Re-Cert Due Date 08/30/17  -KW       User Key  (r) = Recorded By, (t) = Taken By, (c) = Cosigned By    Initials Name Provider Type    KW Awa Paez, PT Physical Therapist                PT Assessment/Plan       08/09/17 1848       PT Assessment    Functional Limitations Limitation in home management;Limitations in community activities;Performance in leisure activities;Performance in self-care ADL;Performance in work activities  -KW     Impairments Dexterity;Endurance;Muscle strength;Pain;Impaired flexibility;Joint mobility;Joint integrity;Posture;Range of motion  -KW     Assessment Comments severe forward head posture, severe neck and back pain, severe limitations in cervical ROM  -KW     Please refer to paper survey for additional self-reported information No  -KW     Rehab Potential Fair  -KW     Patient/caregiver participated in establishment of treatment plan and goals Yes  -KW     Patient would benefit from skilled therapy intervention Yes  -KW     PT Plan    PT Frequency 3x/week  -KW     Predicted Duration of Therapy Intervention (days/wks) 3 weeks  -KW     Planned CPT's? PT EVAL MOD COMPLELITY: 52981;PT RE-EVAL: 69641;PT THER PROC EA 15 MIN: 49384;PT NEUROMUSC RE-EDUCATION EA 15 MIN: 39406;PT MANUAL THERAPY EA 15 MIN: 30211;PT THER ACT EA 15 MIN: 51379;PT AQUATIC THERAPY EA 15 MIN: 42099;PT ULTRASOUND EA 15 MIN: 43341;PT ELECTRICAL STIM UNATTEND: ;PT THER SUPP EA 15 MIN  -KW     Physical Therapy Interventions (Optional Details) aquatics  exercise;lumbar stabilization;ROM (Range of Motion);strengthening;stretching;modalities;manual therapy techniques;neuromuscular re-education;gross motor skills;home exercise program;joint mobilization;patient/family education;postural re-education  -KW     PT Plan Comments PT 2-3 times per week for severe neck and back pain  -KW       User Key  (r) = Recorded By, (t) = Taken By, (c) = Cosigned By    Initials Name Provider Type    BRAYAN Paez PT Physical Therapist                  Exercises       08/09/17 1700          Subjective Pain    Able to rate subjective pain? yes  -KW      Pre-Treatment Pain Level 10  -KW      Post-Treatment Pain Level 10  -KW        User Key  (r) = Recorded By, (t) = Taken By, (c) = Cosigned By    Initials Name Provider Type    BRAYAN Paez PT Physical Therapist                              Outcome Measures       08/09/17 1800          Neck Disability Index    Section 1 - Pain Intensity 5  -KW      Section 2 - Personal Care 0  -KW      Section 3 - Lifting 3  -KW      Section 4 - Work 2  -KW      Section 5 - Headaches 5  -KW      Section 6 - Concentration 2  -KW      Section 7 - Sleeping 5  -KW      Section 8 - Driving 4  -KW      Section 9 - Reading 4  -KW      Section 10 - Recreation 5  -KW      Neck Disability Index Score 35  -KW      Functional Assessment    Outcome Measure Options Neck Disability Index (NDI)  -KW        User Key  (r) = Recorded By, (t) = Taken By, (c) = Cosigned By    Initials Name Provider Type    BRAYAN Paez PT Physical Therapist            Time Calculation:   Start Time: 1715  Stop Time: 1800  Time Calculation (min): 45 min  Total Timed Code Minutes- PT: 0 minute(s)     Therapy Charges for Today     Code Description Service Date Service Provider Modifiers Qty    88041306932  PT EVAL MOD COMPLEXITY 3 8/9/2017 Awa Paez, PT GP 1          PT G-Codes  Outcome Measure Options: Neck Disability Index (NDI)         Awa Paez,  PT  8/9/2017

## 2017-08-16 ENCOUNTER — TELEPHONE (OUTPATIENT)
Dept: OBSTETRICS AND GYNECOLOGY | Facility: CLINIC | Age: 57
End: 2017-08-16

## 2017-08-16 ENCOUNTER — APPOINTMENT (OUTPATIENT)
Dept: PHYSICAL THERAPY | Facility: HOSPITAL | Age: 57
End: 2017-08-16
Attending: FAMILY MEDICINE

## 2017-08-16 DIAGNOSIS — G89.29 CHRONIC MIDLINE LOW BACK PAIN WITHOUT SCIATICA: ICD-10-CM

## 2017-08-16 DIAGNOSIS — M54.50 CHRONIC MIDLINE LOW BACK PAIN WITHOUT SCIATICA: ICD-10-CM

## 2017-08-16 DIAGNOSIS — M54.2 NECK PAIN: ICD-10-CM

## 2017-08-16 RX ORDER — CYCLOBENZAPRINE HCL 10 MG
TABLET ORAL
Qty: 30 TABLET | Refills: 0 | OUTPATIENT
Start: 2017-08-16

## 2017-08-16 NOTE — TELEPHONE ENCOUNTER
PATIENT CALLED FOR REFILL OF :     FLEXERIL 10 MG    [facility]    THANK YOU.

## 2017-08-17 ENCOUNTER — TELEPHONE (OUTPATIENT)
Dept: OBSTETRICS AND GYNECOLOGY | Facility: CLINIC | Age: 57
End: 2017-08-17

## 2017-08-17 ENCOUNTER — APPOINTMENT (OUTPATIENT)
Dept: PHYSICAL THERAPY | Facility: HOSPITAL | Age: 57
End: 2017-08-17
Attending: FAMILY MEDICINE

## 2017-08-17 DIAGNOSIS — M54.50 CHRONIC MIDLINE LOW BACK PAIN WITHOUT SCIATICA: ICD-10-CM

## 2017-08-17 DIAGNOSIS — M54.2 NECK PAIN: ICD-10-CM

## 2017-08-17 DIAGNOSIS — G89.29 CHRONIC MIDLINE LOW BACK PAIN WITHOUT SCIATICA: ICD-10-CM

## 2017-08-17 RX ORDER — CYCLOBENZAPRINE HCL 10 MG
10 TABLET ORAL 3 TIMES DAILY PRN
Qty: 30 TABLET | Refills: 2 | Status: SHIPPED | OUTPATIENT
Start: 2017-08-17

## 2017-08-17 RX ORDER — CYCLOBENZAPRINE HCL 10 MG
TABLET ORAL
Qty: 30 TABLET | Refills: 0 | OUTPATIENT
Start: 2017-08-17

## 2017-08-22 ENCOUNTER — HOSPITAL ENCOUNTER (OUTPATIENT)
Dept: PHYSICAL THERAPY | Facility: HOSPITAL | Age: 57
Setting detail: THERAPIES SERIES
Discharge: HOME | End: 2017-08-22
Attending: FAMILY MEDICINE

## 2017-08-22 ENCOUNTER — TELEPHONE (OUTPATIENT)
Dept: FAMILY MEDICINE CLINIC | Facility: CLINIC | Age: 57
End: 2017-08-22

## 2017-08-22 ENCOUNTER — OFFICE VISIT (OUTPATIENT)
Dept: FAMILY MEDICINE CLINIC | Facility: CLINIC | Age: 57
End: 2017-08-22

## 2017-08-22 VITALS
SYSTOLIC BLOOD PRESSURE: 118 MMHG | DIASTOLIC BLOOD PRESSURE: 82 MMHG | BODY MASS INDEX: 25.27 KG/M2 | WEIGHT: 157.25 LBS | HEIGHT: 66 IN

## 2017-08-22 DIAGNOSIS — M54.50 CHRONIC MIDLINE LOW BACK PAIN WITHOUT SCIATICA: Primary | ICD-10-CM

## 2017-08-22 DIAGNOSIS — G89.29 CHRONIC MIDLINE LOW BACK PAIN WITH SCIATICA, SCIATICA LATERALITY UNSPECIFIED: ICD-10-CM

## 2017-08-22 DIAGNOSIS — M54.2 CERVICALGIA: Primary | ICD-10-CM

## 2017-08-22 DIAGNOSIS — G89.29 CHRONIC MIDLINE LOW BACK PAIN WITHOUT SCIATICA: Primary | ICD-10-CM

## 2017-08-22 DIAGNOSIS — M54.40 CHRONIC MIDLINE LOW BACK PAIN WITH SCIATICA, SCIATICA LATERALITY UNSPECIFIED: ICD-10-CM

## 2017-08-22 DIAGNOSIS — M54.2 NECK PAIN: ICD-10-CM

## 2017-08-22 PROCEDURE — 99213 OFFICE O/P EST LOW 20 MIN: CPT | Performed by: FAMILY MEDICINE

## 2017-08-22 RX ORDER — TRIAMCINOLONE ACETONIDE 40 MG/ML
40 INJECTION, SUSPENSION INTRA-ARTICULAR; INTRAMUSCULAR ONCE
Status: SHIPPED | OUTPATIENT
Start: 2017-08-22

## 2017-08-22 RX ORDER — NABUMETONE 500 MG/1
500 TABLET, FILM COATED ORAL 2 TIMES DAILY PRN
Qty: 60 TABLET | Refills: 1 | Status: SHIPPED | OUTPATIENT
Start: 2017-08-22

## 2017-08-22 NOTE — PROGRESS NOTES
Subjective:     Gregg Echols is a 57 y.o. male who presents for follow up for chonic neck and low back pain. Pt has a 3-4 month history of neck pain that radiates down into his shoulder blades and down his right arm. Pt describes a numbness and tingling sensation in right arm. Pt describes low back pain of similar duration. Low back pain does not radiate. Pt currently on Relafen and flexeril which he says helps some but still in pain. Pt states pain is keeping him from sleep and is constant but worse with movement. Pt tried physical therapy and says it is not helping and sometimes makes the pain worse. Pt is agreeable to Kenalog shot at this time. Pt is agreeable to further imaging and an ortho referral.    Preventative:  Over the past 2 weeks, have you felt down, depressed, or hopeless?No   Over the past 2 weeks, have you felt little interest or pleasure in doing things?No  Clinical depression screening refused by patient.No     Past Medical Hx:  Past Medical History:   Diagnosis Date   • Calculus of gallbladder with acute cholecystitis without obstruction    • Chronic bronchitis    • Pneumonia        Past Surgical Hx:  Past Surgical History:   Procedure Laterality Date   • BACK SURGERY     • GALLBLADDER SURGERY  12/16/2015    Laparoscopic converted to open cholecystectomy       Health Maintenance:  Health Maintenance   Topic Date Due   • COLONOSCOPY  03/29/2017   • INFLUENZA VACCINE  09/01/2017   • TDAP/TD VACCINES (2 - Td) 04/06/2027   • PNEUMOCOCCAL VACCINE (19-64 MEDIUM RISK)  Completed   • HEPATITIS C SCREENING  Completed       Current Meds:    Current Outpatient Prescriptions:   •  albuterol (PROVENTIL HFA;VENTOLIN HFA) 108 (90 BASE) MCG/ACT inhaler, Inhale 2 puffs Every 4 (Four) Hours As Needed for Wheezing or Shortness of Air., Disp: 1 inhaler, Rfl: 11  •  cyclobenzaprine (FLEXERIL) 10 MG tablet, Take 1 tablet by mouth 3 (Three) Times a Day As Needed for Muscle Spasms., Disp: 30 tablet, Rfl: 2  •   HYDROcodone-acetaminophen (NORCO) 5-325 MG per tablet, Take 1 tablet by mouth Every 6 (Six) Hours As Needed (pain)., Disp: 15 tablet, Rfl: 0  •  nabumetone (RELAFEN) 500 MG tablet, Take 1 tablet by mouth 2 (Two) Times a Day As Needed for Mild Pain (1-3)., Disp: 60 tablet, Rfl: 1  •  nicotine (EQ NICOTINE) 21 MG/24HR patch, Place 1 patch on the skin Daily., Disp: 28 patch, Rfl: 5  •  simvastatin (ZOCOR) 20 MG tablet, Take 1 tablet by mouth Every Night., Disp: 30 tablet, Rfl: 9  •  Umeclidinium-Vilanterol 62.5-25 MCG/INH aerosol powder , Inhale 1 puff Daily., Disp: 1 each, Rfl: 11    Allergies:  Review of patient's allergies indicates no known allergies.    Family Hx:  Family History   Problem Relation Age of Onset   • Cancer Mother    • Cancer Father    • Diabetes Father    • Emphysema Father    • Cancer Brother    • Heart failure Brother         Social History:  Social History     Social History   • Marital status: Legally      Spouse name: N/A   • Number of children: N/A   • Years of education: N/A     Occupational History   • Not on file.     Social History Main Topics   • Smoking status: Current Every Day Smoker     Packs/day: 1.00     Types: Cigarettes   • Smokeless tobacco: Not on file   • Alcohol use Yes      Comment: occasional   • Drug use: No   • Sexual activity: Defer     Other Topics Concern   • Not on file     Social History Narrative       Review of Systems  Review of Systems   Constitutional: Negative for chills and fever.   HENT: Negative for congestion and sore throat.    Eyes: Negative for pain, discharge, redness and itching.   Respiratory: Negative for cough, shortness of breath, wheezing and stridor.    Cardiovascular: Negative for chest pain, palpitations and leg swelling.   Gastrointestinal: Negative for abdominal pain, blood in stool, constipation, diarrhea, nausea and vomiting.   Genitourinary: Negative for dysuria, flank pain and hematuria.   Musculoskeletal: Positive for back pain  "(chronic low back) and neck pain (chronic). Negative for neck stiffness.   Skin: Negative for rash and wound.   Neurological: Negative for seizures, syncope and headaches.   Psychiatric/Behavioral: Negative for agitation and confusion.         Objective:     Ht 66\" (167.6 cm)  Wt 157 lb 4 oz (71.3 kg)  BMI 25.38 kg/m2  Physical Exam   Constitutional: He is oriented to person, place, and time. He appears well-developed and well-nourished. No distress.   HENT:   Head: Normocephalic and atraumatic.   Right Ear: External ear normal.   Left Ear: External ear normal.   Nose: Nose normal.   Eyes: Conjunctivae and EOM are normal. Pupils are equal, round, and reactive to light. Right eye exhibits no discharge. Left eye exhibits no discharge. No scleral icterus.   Neck: Normal range of motion. Neck supple. No tracheal deviation present. No thyromegaly present.   Cardiovascular: Normal rate, regular rhythm and normal heart sounds.  Exam reveals no gallop and no friction rub.    No murmur heard.  Pulmonary/Chest: Effort normal and breath sounds normal. No respiratory distress. He has no wheezes. He has no rales.   Abdominal: Soft. Bowel sounds are normal. He exhibits no distension. There is no tenderness.   Musculoskeletal: Normal range of motion. He exhibits no edema or deformity.   Neurological: He is alert and oriented to person, place, and time.   Skin: Skin is warm and dry. He is not diaphoretic.   Psychiatric: He has a normal mood and affect. His behavior is normal. Judgment and thought content normal.   Nursing note and vitals reviewed.                                                                    Assessment/Plan:     Diagnoses and all orders for this visit:    Chronic midline low back pain with right-sided sciatica  -     triamcinolone acetonide (KENALOG-40) injection 40 mg; Inject 1 mL into the shoulder, thigh, or buttocks 1 (One) Time.  -     Ambulatory Referral to Orthopedic Surgery  -     MRI Cervical Spine " Without Contrast; Future  -     MRI Lumbar Spine Without Contrast; Future    Neck pain  -     triamcinolone acetonide (KENALOG-40) injection 40 mg; Inject 1 mL into the shoulder, thigh, or buttocks 1 (One) Time.  -     Ambulatory Referral to Orthopedic Surgery  -     MRI Cervical Spine Without Contrast; Future  -     MRI Lumbar Spine Without Contrast; Future         Follow-up:     Return in about 2 months (around 10/22/2017) for Next scheduled follow up.        GOALS:  Maintain medication compliance    Preventative:  Male Preventative: Cholesterol screening up to date  Vaccines:   Tetanus vaccine: up to date  Annual influenza vaccine: up to date   Pneumococcal vaccine: up to date    Smoking cessation counseling was provided.  regular (moderate)  eat more fruits and vegetables, decrease soda or juice intake, increase water intake and increase physical activity    RISK SCORE: 3    Mitch Kamara MD PGY2  [place] [address]  Office: [phone]      This document has been electronically signed by Mitch Kamara MD on August 22, 2017 3:58 PM

## 2017-08-22 NOTE — THERAPY DISCHARGE NOTE
Outpatient Physical Therapy Ortho Initial Evaluation/Discharge  [facility]     Patient Name: Gregg Echols  : 1960  MRN: [id number]  Today's Date: 2017      Visit Date: 2017     D/C from PT 17          Patient Active Problem List   Diagnosis   • Chronic bronchitis   • Tobacco use disorder   • Chronic rhinitis   • Chest pain   • Low back pain   • Neck pain        Past Medical History:   Diagnosis Date   • Calculus of gallbladder with acute cholecystitis without obstruction    • Chronic bronchitis    • Pneumonia         Past Surgical History:   Procedure Laterality Date   • BACK SURGERY     • GALLBLADDER SURGERY  2015    Laparoscopic converted to open cholecystectomy         Visit Dx:     ICD-10-CM ICD-9-CM   1. Cervicalgia M54.2 723.1   2. Chronic midline low back pain with sciatica, sciatica laterality unspecified M54.40 724.2    G89.29 724.3     338.29                                                               Time Calculation:                  OP PT Discharge Summary  Date of Discharge: 17  Reason for Discharge: Patient/Caregiver request  Outcomes Achieved: Unable to tolerate or actively participate in therapy  Discharge Instructions: D/C from PT to MD office        Awa Paez, PT  2017

## 2017-08-22 NOTE — TELEPHONE ENCOUNTER
Patient called and is wanting to talk to you regarding being in therapy, therapy is not helping he is in pain and barely sleeping wanting a return call.

## 2017-08-25 ENCOUNTER — TELEPHONE (OUTPATIENT)
Dept: FAMILY MEDICINE CLINIC | Facility: CLINIC | Age: 57
End: 2017-08-25

## 2017-08-25 ENCOUNTER — HOSPITAL ENCOUNTER (OUTPATIENT)
Dept: MRI IMAGING | Facility: HOSPITAL | Age: 57
Discharge: HOME | End: 2017-08-25
Admitting: FAMILY MEDICINE

## 2017-08-25 DIAGNOSIS — R29.898 WEAKNESS OF BOTH LOWER EXTREMITIES: Primary | ICD-10-CM

## 2017-08-25 DIAGNOSIS — M54.50 CHRONIC MIDLINE LOW BACK PAIN WITHOUT SCIATICA: ICD-10-CM

## 2017-08-25 DIAGNOSIS — G89.29 CHRONIC MIDLINE LOW BACK PAIN WITHOUT SCIATICA: ICD-10-CM

## 2017-08-25 DIAGNOSIS — M54.2 NECK PAIN: ICD-10-CM

## 2017-08-25 PROCEDURE — 72148 MRI LUMBAR SPINE W/O DYE: CPT

## 2017-08-25 NOTE — TELEPHONE ENCOUNTER
PATIENT CALLED IN   HIS LEGS ARE COMPLETELY NUMB, HAVING TROUBLE STANDING    HE IS GOING TO CALL HIS INSURANCE CO TODAY ABOUT COVERAGE FOR AN MRI

## 2017-08-26 ENCOUNTER — HOSPITAL ENCOUNTER (EMERGENCY)
Facility: HOSPITAL | Age: 57
Discharge: HOME | End: 2017-08-26
Attending: EMERGENCY MEDICINE | Admitting: EMERGENCY MEDICINE

## 2017-08-26 VITALS
SYSTOLIC BLOOD PRESSURE: 135 MMHG | BODY MASS INDEX: 25.07 KG/M2 | RESPIRATION RATE: 20 BRPM | DIASTOLIC BLOOD PRESSURE: 80 MMHG | TEMPERATURE: 98.1 F | WEIGHT: 156 LBS | HEART RATE: 109 BPM | OXYGEN SATURATION: 100 % | HEIGHT: 66 IN

## 2017-08-26 DIAGNOSIS — M54.9 BACK PAIN, UNSPECIFIED BACK LOCATION, UNSPECIFIED BACK PAIN LATERALITY, UNSPECIFIED CHRONICITY: Primary | ICD-10-CM

## 2017-08-26 PROCEDURE — 99283 EMERGENCY DEPT VISIT LOW MDM: CPT

## 2017-08-26 RX ORDER — OXYCODONE HYDROCHLORIDE AND ACETAMINOPHEN 5; 325 MG/1; MG/1
1 TABLET ORAL EVERY 6 HOURS PRN
Qty: 13 TABLET | Refills: 0 | Status: SHIPPED | OUTPATIENT
Start: 2017-08-26

## 2017-08-26 RX ORDER — OXYCODONE HYDROCHLORIDE AND ACETAMINOPHEN 5; 325 MG/1; MG/1
2 TABLET ORAL ONCE
Status: COMPLETED | OUTPATIENT
Start: 2017-08-26 | End: 2017-08-26

## 2017-08-26 RX ADMIN — OXYCODONE HYDROCHLORIDE AND ACETAMINOPHEN 2 TABLET: 5; 325 TABLET ORAL at 15:41

## 2017-08-26 NOTE — ED PROVIDER NOTES
Subjective   History of Present Illness   57-year-old male comes into the emergency department because of an abnormal MRI was obtained yesterday.  He's been having back pain for the last 6-7 months mostly in his upper back.  It is reproducible and has been seen multiple times for this last few months.  He was seen twice in the emergency department and once by his primary care doctor.  Each time the pain was reproducible over his trapezius muscles and he was placed on muscle relaxants and anti-inflammatories.  The pain persisted to the point where he was unable to ambulate secondary to the pain the pain also radiates down his paraspinal area to his lower back and across his lumbar spine.  He does not have any history of cancer, IV drug abuse, immunosuppression, recent back surgery, fevers.  He does report weakness now.  He also reports some constipation and what feels like urinary retention.  His MRI that was obtained showed mostly disease in the vertebral bodies and the pelvis concerning for bone marrow involvement such as malocclusion disorder or metastasis.  It also showed some extradural masses did not have any central cord compression.  He was told to come the emergency department to obtain an MRI with contrast.  Review of Systems   Constitutional: Negative for fever.   HENT: Negative for congestion, nosebleeds, postnasal drip, sinus pressure and sore throat.    Eyes: Negative for pain and redness.   Respiratory: Negative for cough, chest tightness, shortness of breath, wheezing and stridor.    Gastrointestinal: Positive for constipation. Negative for abdominal pain, diarrhea, nausea and vomiting.   Genitourinary: Positive for decreased urine volume. Negative for dysuria, flank pain, hematuria and testicular pain.   Musculoskeletal:        Except for history of present illness   Skin: Negative for rash.   Neurological: Negative for syncope, light-headedness and headaches.   Psychiatric/Behavioral: Negative.         Past Medical History:   Diagnosis Date   • Calculus of gallbladder with acute cholecystitis without obstruction    • Chronic bronchitis    • Pneumonia        No Known Allergies    Past Surgical History:   Procedure Laterality Date   • BACK SURGERY     • GALLBLADDER SURGERY  12/16/2015    Laparoscopic converted to open cholecystectomy       Family History   Problem Relation Age of Onset   • Cancer Mother    • Cancer Father    • Diabetes Father    • Emphysema Father    • Cancer Brother    • Heart failure Brother        Social History     Social History   • Marital status: Legally      Spouse name: N/A   • Number of children: N/A   • Years of education: N/A     Social History Main Topics   • Smoking status: Current Every Day Smoker     Packs/day: 1.00     Types: Cigarettes   • Smokeless tobacco: None   • Alcohol use Yes      Comment: occasional   • Drug use: No   • Sexual activity: Defer     Other Topics Concern   • None     Social History Narrative           Objective   Physical Exam   Constitutional: He is oriented to person, place, and time. He appears well-developed and well-nourished.   HENT:   Head: Normocephalic and atraumatic.   Eyes: Conjunctivae and EOM are normal. Pupils are equal, round, and reactive to light.   Neck: Normal range of motion. Neck supple.   Cardiovascular: Normal rate, regular rhythm and normal heart sounds.    Pulmonary/Chest: Effort normal and breath sounds normal.   Abdominal: Soft. He exhibits no distension. There is no tenderness. There is no rebound and no guarding.   Genitourinary:   Genitourinary Comments: Patient has normal rectal tone..  Soft brown stool in rectal vault   Musculoskeletal: Normal range of motion.   He has no midline lumbar tenderness palpation step-offs or deformities.  There is no fluctuance.  He has reproducible pain over his trapezius once again since pain and spasms down his paraspinal area.  This is the pain is mostly been having.  He does have  some weakness but is able to ambulate although it is slightly difficult for him.   Neurological: He is alert and oriented to person, place, and time.   Skin: Skin is warm and dry.   Psychiatric: He has a normal mood and affect.   Nursing note and vitals reviewed.      Procedures         ED Course  ED Course                  MDM  Number of Diagnoses or Management Options  Back pain, unspecified back location, unspecified back pain laterality, unspecified chronicity:   Diagnosis management comments: We are unable to obtain an MRI with contrast this facility today.  I discussed the possibility of transferring the patient to [facility] where he will be able to obtain the MRI that he does not want to be transferred today.  After reviewing his images it does not sound like the patient is having cauda equina based on the imaging.  Bladder scan showed around 150 for just over 150 cc of urine.  He has good rectal tone.  It does sound like he may be having some urinary retention however this was not seen on bladder scan.  I discussed the fact that he does not sound like he is having cauda equina at this time with the patient still recommended that he get the MRI done.  Return to the emergency department tomorrow morning to have him with contrast completed.  We will give him pain medication the meantime he will come back tomorrow.  He understands he will likely need to be transferred if there is some diminished be done urgently or emergently from his MRI tomorrow.      Final diagnoses:   Back pain, unspecified back location, unspecified back pain laterality, unspecified chronicity            Ulises Desai MD  08/26/17 0510

## 2017-08-27 ENCOUNTER — APPOINTMENT (OUTPATIENT)
Dept: MRI IMAGING | Facility: HOSPITAL | Age: 57
End: 2017-08-27

## 2017-08-27 ENCOUNTER — HOSPITAL ENCOUNTER (EMERGENCY)
Facility: HOSPITAL | Age: 57
Discharge: ANOTHER HOSPITAL | End: 2017-08-27
Attending: EMERGENCY MEDICINE | Admitting: EMERGENCY MEDICINE

## 2017-08-27 VITALS
BODY MASS INDEX: 25.07 KG/M2 | RESPIRATION RATE: 18 BRPM | DIASTOLIC BLOOD PRESSURE: 74 MMHG | WEIGHT: 156 LBS | OXYGEN SATURATION: 98 % | HEIGHT: 66 IN | SYSTOLIC BLOOD PRESSURE: 127 MMHG | TEMPERATURE: 98.2 F | HEART RATE: 98 BPM

## 2017-08-27 DIAGNOSIS — C79.51 METASTASIS TO SPINAL COLUMN (HCC): Primary | ICD-10-CM

## 2017-08-27 DIAGNOSIS — R33.9 URINARY RETENTION: ICD-10-CM

## 2017-08-27 LAB
ALBUMIN SERPL-MCNC: 3.7 G/DL (ref 3.4–4.8)
ALBUMIN/GLOB SERPL: 1.3 G/DL (ref 1.1–1.8)
ALP SERPL-CCNC: 219 U/L (ref 38–126)
ALT SERPL W P-5'-P-CCNC: 49 U/L (ref 21–72)
AMPHET+METHAMPHET UR QL: NEGATIVE
ANION GAP SERPL CALCULATED.3IONS-SCNC: 11 MMOL/L (ref 5–15)
ANISOCYTOSIS BLD QL: NORMAL
APTT PPP: 30.3 SECONDS (ref 20–40.3)
AST SERPL-CCNC: 69 U/L (ref 17–59)
BARBITURATES UR QL SCN: NEGATIVE
BASOPHILS # BLD AUTO: 0.08 10*3/MM3 (ref 0–0.2)
BASOPHILS NFR BLD AUTO: 1.5 % (ref 0–2)
BENZODIAZ UR QL SCN: NEGATIVE
BILIRUB SERPL-MCNC: 0.4 MG/DL (ref 0.2–1.3)
BILIRUB UR QL STRIP: NEGATIVE
BUN BLD-MCNC: 15 MG/DL (ref 7–21)
BUN/CREAT SERPL: 32.6 (ref 7–25)
CALCIUM SPEC-SCNC: 10.1 MG/DL (ref 8.4–10.2)
CANNABINOIDS SERPL QL: NEGATIVE
CHLORIDE SERPL-SCNC: 93 MMOL/L (ref 95–110)
CLARITY UR: CLEAR
CO2 SERPL-SCNC: 25 MMOL/L (ref 22–31)
COCAINE UR QL: NEGATIVE
COLOR UR: YELLOW
CREAT BLD-MCNC: 0.46 MG/DL (ref 0.7–1.3)
CRP SERPL-MCNC: 16.3 MG/DL (ref 0–1)
D-LACTATE SERPL-SCNC: 1.2 MMOL/L (ref 0.5–2)
DEPRECATED RDW RBC AUTO: 46.5 FL (ref 35.1–43.9)
EOSINOPHIL # BLD AUTO: 0.12 10*3/MM3 (ref 0–0.7)
EOSINOPHIL NFR BLD AUTO: 2.3 % (ref 0–7)
ERYTHROCYTE [DISTWIDTH] IN BLOOD BY AUTOMATED COUNT: 14.1 % (ref 11.5–14.5)
ERYTHROCYTE [SEDIMENTATION RATE] IN BLOOD: 73 MM/HR (ref 0–15)
GFR SERPL CREATININE-BSD FRML MDRD: 187 ML/MIN/1.73 (ref 56–130)
GLOBULIN UR ELPH-MCNC: 2.9 GM/DL (ref 2.3–3.5)
GLUCOSE BLD-MCNC: 104 MG/DL (ref 60–100)
GLUCOSE UR STRIP-MCNC: NEGATIVE MG/DL
HCT VFR BLD AUTO: 34.2 % (ref 39–49)
HGB BLD-MCNC: 12.4 G/DL (ref 13.7–17.3)
HGB UR QL STRIP.AUTO: NEGATIVE
HOLD SPECIMEN: NORMAL
IMM GRANULOCYTES # BLD: 0.41 10*3/MM3 (ref 0–0.02)
IMM GRANULOCYTES NFR BLD: 7.7 % (ref 0–0.5)
INR PPP: 0.95 (ref 0.8–1.2)
KETONES UR QL STRIP: NEGATIVE
LEUKOCYTE ESTERASE UR QL STRIP.AUTO: NEGATIVE
LYMPHOCYTES # BLD AUTO: 1.5 10*3/MM3 (ref 0.6–4.2)
LYMPHOCYTES NFR BLD AUTO: 28.2 % (ref 10–50)
MAGNESIUM SERPL-MCNC: 2 MG/DL (ref 1.6–2.3)
MCH RBC QN AUTO: 32.6 PG (ref 26.5–34)
MCHC RBC AUTO-ENTMCNC: 36.3 G/DL (ref 31.5–36.3)
MCV RBC AUTO: 90 FL (ref 80–98)
METHADONE UR QL SCN: NEGATIVE
MONOCYTES # BLD AUTO: 0.57 10*3/MM3 (ref 0–0.9)
MONOCYTES NFR BLD AUTO: 10.7 % (ref 0–12)
NEUTROPHILS # BLD AUTO: 2.63 10*3/MM3 (ref 2–8.6)
NEUTROPHILS NFR BLD AUTO: 49.6 % (ref 37–80)
NITRITE UR QL STRIP: NEGATIVE
NRBC BLD MANUAL-RTO: 2.6 /100 WBC (ref 0–0)
OPIATES UR QL: POSITIVE
OXYCODONE UR QL SCN: POSITIVE
PH UR STRIP.AUTO: 6 [PH] (ref 5–9)
PHOSPHATE SERPL-MCNC: 5.8 MG/DL (ref 2.4–4.4)
PLATELET # BLD AUTO: 105 10*3/MM3 (ref 150–450)
PMV BLD AUTO: 8.9 FL (ref 8–12)
POLYCHROMASIA BLD QL SMEAR: NORMAL
POTASSIUM BLD-SCNC: 3.8 MMOL/L (ref 3.5–5.1)
PROT SERPL-MCNC: 6.6 G/DL (ref 6.3–8.6)
PROT UR QL STRIP: NEGATIVE
PROTHROMBIN TIME: 12.6 SECONDS (ref 11.1–15.3)
RBC # BLD AUTO: 3.8 10*6/MM3 (ref 4.37–5.74)
SMALL PLATELETS BLD QL SMEAR: NORMAL
SODIUM BLD-SCNC: 129 MMOL/L (ref 137–145)
SP GR UR STRIP: 1.01 (ref 1–1.03)
T4 FREE SERPL-MCNC: 1.44 NG/DL (ref 0.78–2.19)
TSH SERPL DL<=0.05 MIU/L-ACNC: 3.48 MIU/ML (ref 0.46–4.68)
UROBILINOGEN UR QL STRIP: NORMAL
WBC MORPH BLD: NORMAL
WBC NRBC COR # BLD: 5.31 10*3/MM3 (ref 3.2–9.8)

## 2017-08-27 PROCEDURE — 25010000002 MORPHINE PER 10 MG: Performed by: EMERGENCY MEDICINE

## 2017-08-27 PROCEDURE — 96375 TX/PRO/DX INJ NEW DRUG ADDON: CPT

## 2017-08-27 PROCEDURE — 85007 BL SMEAR W/DIFF WBC COUNT: CPT | Performed by: EMERGENCY MEDICINE

## 2017-08-27 PROCEDURE — 72149 MRI LUMBAR SPINE W/DYE: CPT

## 2017-08-27 PROCEDURE — 83735 ASSAY OF MAGNESIUM: CPT | Performed by: EMERGENCY MEDICINE

## 2017-08-27 PROCEDURE — 84439 ASSAY OF FREE THYROXINE: CPT | Performed by: EMERGENCY MEDICINE

## 2017-08-27 PROCEDURE — 80053 COMPREHEN METABOLIC PANEL: CPT | Performed by: EMERGENCY MEDICINE

## 2017-08-27 PROCEDURE — 85730 THROMBOPLASTIN TIME PARTIAL: CPT | Performed by: EMERGENCY MEDICINE

## 2017-08-27 PROCEDURE — 84100 ASSAY OF PHOSPHORUS: CPT | Performed by: EMERGENCY MEDICINE

## 2017-08-27 PROCEDURE — 80307 DRUG TEST PRSMV CHEM ANLYZR: CPT | Performed by: EMERGENCY MEDICINE

## 2017-08-27 PROCEDURE — 83874 ASSAY OF MYOGLOBIN: CPT | Performed by: EMERGENCY MEDICINE

## 2017-08-27 PROCEDURE — 84443 ASSAY THYROID STIM HORMONE: CPT | Performed by: EMERGENCY MEDICINE

## 2017-08-27 PROCEDURE — A9576 INJ PROHANCE MULTIPACK: HCPCS | Performed by: EMERGENCY MEDICINE

## 2017-08-27 PROCEDURE — 96376 TX/PRO/DX INJ SAME DRUG ADON: CPT

## 2017-08-27 PROCEDURE — 25010000002 DIAZEPAM PER 5 MG: Performed by: EMERGENCY MEDICINE

## 2017-08-27 PROCEDURE — 25010000002 GADOTERIDOL PER 1 ML: Performed by: EMERGENCY MEDICINE

## 2017-08-27 PROCEDURE — 85025 COMPLETE CBC W/AUTO DIFF WBC: CPT | Performed by: EMERGENCY MEDICINE

## 2017-08-27 PROCEDURE — 96374 THER/PROPH/DIAG INJ IV PUSH: CPT

## 2017-08-27 PROCEDURE — 84145 PROCALCITONIN (PCT): CPT | Performed by: EMERGENCY MEDICINE

## 2017-08-27 PROCEDURE — 85651 RBC SED RATE NONAUTOMATED: CPT | Performed by: EMERGENCY MEDICINE

## 2017-08-27 PROCEDURE — 81003 URINALYSIS AUTO W/O SCOPE: CPT | Performed by: EMERGENCY MEDICINE

## 2017-08-27 PROCEDURE — 83605 ASSAY OF LACTIC ACID: CPT | Performed by: EMERGENCY MEDICINE

## 2017-08-27 PROCEDURE — 86140 C-REACTIVE PROTEIN: CPT | Performed by: EMERGENCY MEDICINE

## 2017-08-27 PROCEDURE — 85610 PROTHROMBIN TIME: CPT | Performed by: EMERGENCY MEDICINE

## 2017-08-27 PROCEDURE — 99284 EMERGENCY DEPT VISIT MOD MDM: CPT

## 2017-08-27 RX ORDER — DIAZEPAM 5 MG/ML
5 INJECTION, SOLUTION INTRAMUSCULAR; INTRAVENOUS ONCE
Status: COMPLETED | OUTPATIENT
Start: 2017-08-27 | End: 2017-08-27

## 2017-08-27 RX ORDER — MORPHINE SULFATE 4 MG/ML
4 INJECTION, SOLUTION INTRAMUSCULAR; INTRAVENOUS ONCE
Status: COMPLETED | OUTPATIENT
Start: 2017-08-27 | End: 2017-08-27

## 2017-08-27 RX ORDER — SODIUM CHLORIDE 0.9 % (FLUSH) 0.9 %
10 SYRINGE (ML) INJECTION AS NEEDED
Status: DISCONTINUED | OUTPATIENT
Start: 2017-08-27 | End: 2017-08-27 | Stop reason: HOSPADM

## 2017-08-27 RX ADMIN — GADOTERIDOL 15 ML: 279.3 INJECTION, SOLUTION INTRAVENOUS at 10:18

## 2017-08-27 RX ADMIN — MORPHINE SULFATE 4 MG: 4 INJECTION, SOLUTION INTRAMUSCULAR; INTRAVENOUS at 08:32

## 2017-08-27 RX ADMIN — DIAZEPAM 5 MG: 5 INJECTION, SOLUTION INTRAMUSCULAR; INTRAVENOUS at 09:43

## 2017-08-27 RX ADMIN — MORPHINE SULFATE 4 MG: 4 INJECTION, SOLUTION INTRAMUSCULAR; INTRAVENOUS at 12:12

## 2017-08-28 LAB — MYOGLOBIN SERPL-MCNC: 38 NG/ML (ref 28–72)

## 2017-08-29 LAB — PROCALCITONIN SERPL-MCNC: 2.86 NG/ML (ref 0–0.08)

## 2020-02-14 NOTE — TELEPHONE ENCOUNTER
PATIENT CALLED.  HE WOULD LIKE A REFERRAL TO A NEUROLOGIST FOR HIS BACK. HE FEELS THAT IT IS A NERVE PROBLEM AND IT IS NOT GETTING ANY BETTER.    PLEASE LET HIM KNOW SOMETHING AS SOON AS POSSIBLE.  IF YOU CALL AND HE DOES NOT ANSWER PLEASE LEAVE A DETAILED MESSAGE.    THANK YOU.   Loss
